# Patient Record
Sex: MALE | Race: WHITE | HISPANIC OR LATINO | Employment: FULL TIME | URBAN - METROPOLITAN AREA
[De-identification: names, ages, dates, MRNs, and addresses within clinical notes are randomized per-mention and may not be internally consistent; named-entity substitution may affect disease eponyms.]

---

## 2019-08-07 ENCOUNTER — OFFICE VISIT (OUTPATIENT)
Dept: FAMILY MEDICINE CLINIC | Facility: CLINIC | Age: 54
End: 2019-08-07
Payer: COMMERCIAL

## 2019-08-07 VITALS
HEART RATE: 76 BPM | SYSTOLIC BLOOD PRESSURE: 126 MMHG | DIASTOLIC BLOOD PRESSURE: 80 MMHG | HEIGHT: 72 IN | BODY MASS INDEX: 24.92 KG/M2 | WEIGHT: 184 LBS | RESPIRATION RATE: 16 BRPM | TEMPERATURE: 97.5 F

## 2019-08-07 DIAGNOSIS — J45.30 MILD PERSISTENT ASTHMA, UNSPECIFIED WHETHER COMPLICATED: Primary | ICD-10-CM

## 2019-08-07 DIAGNOSIS — Z12.11 ENCOUNTER FOR SCREENING FOR MALIGNANT NEOPLASM OF COLON: ICD-10-CM

## 2019-08-07 DIAGNOSIS — Z13.6 SCREENING FOR CARDIOVASCULAR CONDITION: ICD-10-CM

## 2019-08-07 PROCEDURE — 99203 OFFICE O/P NEW LOW 30 MIN: CPT | Performed by: FAMILY MEDICINE

## 2019-08-07 RX ORDER — FLUTICASONE PROPIONATE AND SALMETEROL XINAFOATE 45; 21 UG/1; UG/1
2 AEROSOL, METERED RESPIRATORY (INHALATION) 2 TIMES DAILY
Refills: 2 | COMMUNITY
Start: 2019-05-18 | End: 2019-09-24 | Stop reason: SDUPTHER

## 2019-08-07 NOTE — PROGRESS NOTES
Chief Complaint   Patient presents with    [facility]     Tdap up to date - ordered crc        Patient ID: Daily Valdovinos is a 62 y o  male  HPI  Pt is seeing for f/u Asthma as a new pt -  Using a vape with nicotine  -  Former smoker, using Advair 2 times a wk  -  "it is helping more that rescue inhaler"    The following portions of the patient's history were reviewed and updated as appropriate: allergies, current medications, past family history, past medical history, past social history, past surgical history and problem list     Review of Systems   Constitutional: Negative  Respiratory: Positive for wheezing (2 times per wk )  Negative for cough, chest tightness and shortness of breath  Cardiovascular: Negative  Gastrointestinal: Negative  Genitourinary: Negative  Musculoskeletal: Negative  Skin: Negative  Neurological: Negative  Current Outpatient Medications   Medication Sig Dispense Refill    ADVAIR HFA 45-21 MCG/ACT inhaler Inhale 2 puffs 2 (two) times a day  2     No current facility-administered medications for this visit  Objective:    /80 (BP Location: Left arm, Patient Position: Sitting, Cuff Size: Standard)   Pulse 76   Temp 97 5 °F (36 4 °C) (Tympanic)   Resp 16   Ht 5' 11 5" (1 816 m)   Wt 83 5 kg (184 lb)   BMI 25 31 kg/m²        Physical Exam   Constitutional: No distress  Cardiovascular: Normal rate, regular rhythm and normal heart sounds  No murmur heard  Pulmonary/Chest: Effort normal  No respiratory distress  He has decreased breath sounds  He has no wheezes  He has no rhonchi  He has no rales  Assessment/Plan:         Diagnoses and all orders for this visit:    Mild persistent asthma, unspecified whether complicated    Encounter for screening for malignant neoplasm of colon  -     Ambulatory referral to Gastroenterology; Future    Screening for cardiovascular condition  -     CBC;  Future  -     Comprehensive metabolic panel; Future  -     Lipid panel; Future  -     TSH, 3rd generation; Future    Other orders  -     ADVAIR HFA 45-21 MCG/ACT inhaler;  Inhale 2 puffs 2 (two) times a day            rto for Tova Pena MD

## 2019-08-10 ENCOUNTER — TELEPHONE (OUTPATIENT)
Dept: FAMILY MEDICINE CLINIC | Facility: CLINIC | Age: 54
End: 2019-08-10

## 2019-08-22 ENCOUNTER — OFFICE VISIT (OUTPATIENT)
Dept: FAMILY MEDICINE CLINIC | Facility: CLINIC | Age: 54
End: 2019-08-22
Payer: COMMERCIAL

## 2019-08-22 VITALS
HEIGHT: 72 IN | RESPIRATION RATE: 16 BRPM | BODY MASS INDEX: 25.06 KG/M2 | SYSTOLIC BLOOD PRESSURE: 116 MMHG | WEIGHT: 185 LBS | HEART RATE: 72 BPM | TEMPERATURE: 97.6 F | DIASTOLIC BLOOD PRESSURE: 80 MMHG

## 2019-08-22 DIAGNOSIS — Z00.00 ROUTINE MEDICAL EXAM: Primary | ICD-10-CM

## 2019-08-22 DIAGNOSIS — J45.30 MILD PERSISTENT ASTHMA, UNSPECIFIED WHETHER COMPLICATED: ICD-10-CM

## 2019-08-22 PROCEDURE — 94010 BREATHING CAPACITY TEST: CPT | Performed by: FAMILY MEDICINE

## 2019-08-22 PROCEDURE — 99396 PREV VISIT EST AGE 40-64: CPT | Performed by: FAMILY MEDICINE

## 2019-08-22 NOTE — PROGRESS NOTES
Chief Complaint   Patient presents with    Physical Exam        Patient ID: Cuong Vidal is a 62 y o  male  HPI  Pt is seeing for CPE     The following portions of the patient's history were reviewed and updated as appropriate: allergies, current medications, past family history, past medical history, past social history, past surgical history and problem list     Review of Systems   Constitutional: Negative for fatigue, fever and unexpected weight change  HENT: Negative for congestion, ear discharge, ear pain, hearing loss, rhinorrhea, sinus pressure, sore throat and trouble swallowing  Eyes: Negative  Respiratory: Negative  Cardiovascular: Negative  Gastrointestinal: Negative  Endocrine: Negative  Genitourinary: Negative  Musculoskeletal: Negative  Skin: Negative  Neurological: Negative for dizziness, weakness, light-headedness and numbness  Hematological: Negative  Psychiatric/Behavioral: Negative  Current Outpatient Medications   Medication Sig Dispense Refill    ADVAIR HFA 45-21 MCG/ACT inhaler Inhale 2 puffs 2 (two) times a day  2     No current facility-administered medications for this visit  Objective:    /80 (BP Location: Left arm, Patient Position: Sitting, Cuff Size: Large)   Pulse 72   Temp 97 6 °F (36 4 °C) (Tympanic)   Resp 16   Ht 5' 11 5" (1 816 m)   Wt 83 9 kg (185 lb)   BMI 25 44 kg/m²        Physical Exam   Constitutional: He is oriented to person, place, and time  He appears well-developed and well-nourished  No distress  HENT:   Head: Normocephalic and atraumatic  Right Ear: Hearing, tympanic membrane, external ear and ear canal normal    Left Ear: Hearing, tympanic membrane, external ear and ear canal normal    Mouth/Throat: Oropharynx is clear and moist  No oropharyngeal exudate  Eyes: Conjunctivae and EOM are normal    Neck: Neck supple  No JVD present  No thyroid mass and no thyromegaly present     Cardiovascular: Regular rhythm and normal heart sounds  Exam reveals no gallop  No murmur heard  Pulmonary/Chest: Breath sounds normal  No respiratory distress  He has no wheezes  He has no rhonchi  He has no rales  Abdominal: Soft  Bowel sounds are normal  There is no tenderness  Musculoskeletal: He exhibits no edema, tenderness or deformity  Lymphadenopathy:     He has no cervical adenopathy  Neurological: He is alert and oriented to person, place, and time  He has normal strength  No cranial nerve deficit  Skin: Skin is intact  No rash noted  No erythema  Psychiatric: He has a normal mood and affect  His behavior is normal          Labs in chart were reviewed          Assessment/Plan:         Diagnoses and all orders for this visit:    Routine medical exam    Mild persistent asthma, unspecified whether complicated  -     POCT spirometry      labs reviewed with pt -  Low  HDL -  32    Diet changed and increase exercises advised   Will contact pharmacy for Shingrix vaccine  UTD with Td -  Will bring records     rto in 1  y                     Elida Holden MD

## 2019-09-24 DIAGNOSIS — J45.30 MILD PERSISTENT ASTHMA, UNSPECIFIED WHETHER COMPLICATED: Primary | ICD-10-CM

## 2019-09-24 RX ORDER — FLUTICASONE PROPIONATE AND SALMETEROL XINAFOATE 45; 21 UG/1; UG/1
2 AEROSOL, METERED RESPIRATORY (INHALATION) 2 TIMES DAILY
Qty: 1 INHALER | Refills: 6 | Status: SHIPPED | OUTPATIENT
Start: 2019-09-24 | End: 2020-04-15

## 2019-10-11 ENCOUNTER — CONSULT (OUTPATIENT)
Dept: GASTROENTEROLOGY | Facility: CLINIC | Age: 54
End: 2019-10-11
Payer: COMMERCIAL

## 2019-10-11 VITALS
TEMPERATURE: 97.6 F | WEIGHT: 187.4 LBS | HEIGHT: 72 IN | DIASTOLIC BLOOD PRESSURE: 70 MMHG | RESPIRATION RATE: 18 BRPM | BODY MASS INDEX: 25.38 KG/M2 | HEART RATE: 76 BPM | SYSTOLIC BLOOD PRESSURE: 104 MMHG

## 2019-10-11 DIAGNOSIS — Z12.11 ENCOUNTER FOR SCREENING FOR MALIGNANT NEOPLASM OF COLON: Primary | ICD-10-CM

## 2019-10-11 PROCEDURE — 99243 OFF/OP CNSLTJ NEW/EST LOW 30: CPT | Performed by: INTERNAL MEDICINE

## 2019-10-11 NOTE — PROGRESS NOTES
Consultation - [facility]  Randa Gong 1965 male         Chief Complaint:  For colonoscopy    HPI:  75-year-old male with history of asthma was referred for colonoscopy  Patient never had colonoscopy in the past   Patient has regular bowel movements and denies any blood or mucus in the stool  Appetite is good and denies any recent weight loss  Denies any abdominal pain, nausea, or vomiting  Has no heartburn or acid reflux  Denies any difficulty swallowing  REVIEW OF SYSTEMS: Review of Systems   Constitutional: Negative for activity change, appetite change, chills, diaphoresis, fatigue, fever and unexpected weight change  HENT: Negative for ear discharge, ear pain, facial swelling, hearing loss, nosebleeds, sore throat, tinnitus and voice change  Eyes: Negative for pain, discharge, redness, itching and visual disturbance  Respiratory: Negative for apnea, cough, chest tightness, shortness of breath and wheezing  Cardiovascular: Negative for chest pain and palpitations  Gastrointestinal:        As noted in HPI   Endocrine: Negative for cold intolerance, heat intolerance and polyuria  Genitourinary: Negative for difficulty urinating, dysuria, flank pain, hematuria and urgency  Musculoskeletal: Negative for arthralgias, back pain, gait problem, joint swelling and myalgias  Skin: Negative for rash and wound  Neurological: Negative for dizziness, tremors, seizures, speech difficulty, light-headedness, numbness and headaches  Hematological: Negative for adenopathy  Does not bruise/bleed easily  Psychiatric/Behavioral: Negative for agitation, behavioral problems and confusion  The patient is not nervous/anxious           Past Medical History:   Diagnosis Date    Asthma       Past Surgical History:   Procedure Laterality Date    APPENDECTOMY       Social History     Socioeconomic History    Marital status: /Civil Union     Spouse name: Not on file    Number of children: Not on file    Years of education: Not on file    Highest education level: Not on file   Occupational History    Not on file   Social Needs    Financial resource strain: Not on file    Food insecurity:     Worry: Not on file     Inability: Not on file    Transportation needs:     Medical: Not on file     Non-medical: Not on file   Tobacco Use    Smoking status: Former Smoker     Last attempt to quit: 8/7/2016     Years since quitting: 3 1    Smokeless tobacco: Current User    Tobacco comment: vape   Substance and Sexual Activity    Alcohol use: Not on file    Drug use: Not on file    Sexual activity: Not on file   Lifestyle    Physical activity:     Days per week: Not on file     Minutes per session: Not on file    Stress: Not on file   Relationships    Social connections:     Talks on phone: Not on file     Gets together: Not on file     Attends [employer] service: Not on file     Active member of club or organization: Not on file     Attends meetings of clubs or organizations: Not on file     Relationship status: Not on file    Intimate partner violence:     Fear of current or ex partner: Not on file     Emotionally abused: Not on file     Physically abused: Not on file     Forced sexual activity: Not on file   Other Topics Concern    Not on file   Social History Narrative    Not on file     Family History   Problem Relation Age of Onset    Lung cancer Mother      Patient has no known allergies  Current Outpatient Medications   Medication Sig Dispense Refill    ADVAIR HFA 45-21 MCG/ACT inhaler Inhale 2 puffs 2 (two) times a day 1 Inhaler 6    Na Sulfate-K Sulfate-Mg Sulf (SUPREP BOWEL PREP KIT) 17 5-3 13-1 6 GM/177ML SOLN Take 2 Bottles by mouth see administration instructions Please follow the instructions from the office 2 Bottle 0     No current facility-administered medications for this visit  Blood pressure 104/70, pulse 76, temperature 97 6 °F (36 4 °C), resp  rate 18, height 5' 11 5" (1 816 m), weight 85 kg (187 lb 6 4 oz)  PHYSICAL EXAM: Physical Exam   Constitutional: He is oriented to person, place, and time  He appears well-developed  HENT:   Head: Normocephalic and atraumatic  Mouth/Throat: Oropharynx is clear and moist    Eyes: Pupils are equal, round, and reactive to light  Conjunctivae are normal  Right eye exhibits no discharge  Left eye exhibits no discharge  No scleral icterus  Neck: Neck supple  No JVD present  No tracheal deviation present  No thyromegaly present  Cardiovascular: Normal rate, regular rhythm, normal heart sounds and intact distal pulses  Exam reveals no gallop and no friction rub  No murmur heard  Pulmonary/Chest: Effort normal and breath sounds normal  No respiratory distress  He has no wheezes  He has no rales  He exhibits no tenderness  Abdominal: Soft  Bowel sounds are normal  He exhibits no distension and no mass  There is no tenderness  There is no rebound and no guarding  No hernia  Musculoskeletal: He exhibits no edema  Lymphadenopathy:     He has no cervical adenopathy  Neurological: He is alert and oriented to person, place, and time  Skin: Skin is warm and dry  No rash noted  No erythema  Psychiatric: He has a normal mood and affect  His behavior is normal  Thought content normal         No results found for: WBC, HGB, HCT, MCV, PLT  No results found for: GLUCOSE, CALCIUM, NA, K, CO2, CL, BUN, CREATININE  No results found for: ALT, AST, GGT, ALKPHOS, BILITOT  No results found for: INR, PROTIME    Patient was never admitted  ASSESSMENT & PLAN:    Encounter for screening for malignant neoplasm of colon  Screening for colon cancer - patient is at average risk for colon cancer screening  Rule out colorectal lesions including polyps or malignancy         -Schedule for colonoscopy      -High-fiber diet     -Patient was given instructions about the colonoscopy prep     -Patient was explained about  the risks and benefits of the procedure  Risks including but not limited to bleeding, infection, perforation were explained in detail  Also explained about less than 100% sensitivity with the exam and other alternatives

## 2019-11-08 NOTE — PRE-PROCEDURE INSTRUCTIONS
Pre-Surgery Instructions:   Medication Instructions   [facility] 20-62 MCG/ACT [facility]er Patient was instructed by Physician and understands   Na Sulfate-K Sulfate-Mg Sulf (SUPREP BOWEL PREP KIT) 17 5-3 13-1 6 GM/177ML SOLN Patient was instructed by Physician and understands  Pt to follow Dr Jh Palmer instructions    wife Peter Feliciano [phone]

## 2019-11-09 ENCOUNTER — ANESTHESIA EVENT (OUTPATIENT)
Dept: GASTROENTEROLOGY | Facility: AMBULARY SURGERY CENTER | Age: 54
End: 2019-11-09

## 2019-11-11 ENCOUNTER — HOSPITAL ENCOUNTER (OUTPATIENT)
Dept: GASTROENTEROLOGY | Facility: AMBULARY SURGERY CENTER | Age: 54
Setting detail: OUTPATIENT SURGERY
Discharge: HOME | End: 2019-11-11
Attending: INTERNAL MEDICINE | Admitting: INTERNAL MEDICINE
Payer: COMMERCIAL

## 2019-11-11 ENCOUNTER — ANESTHESIA (OUTPATIENT)
Dept: GASTROENTEROLOGY | Facility: AMBULARY SURGERY CENTER | Age: 54
End: 2019-11-11

## 2019-11-11 VITALS
OXYGEN SATURATION: 97 % | DIASTOLIC BLOOD PRESSURE: 76 MMHG | WEIGHT: 187 LBS | HEIGHT: 72 IN | SYSTOLIC BLOOD PRESSURE: 124 MMHG | BODY MASS INDEX: 25.33 KG/M2 | TEMPERATURE: 96.4 F | HEART RATE: 71 BPM | RESPIRATION RATE: 18 BRPM

## 2019-11-11 DIAGNOSIS — Z12.11 ENCOUNTER FOR SCREENING FOR MALIGNANT NEOPLASM OF COLON: ICD-10-CM

## 2019-11-11 PROCEDURE — G0121 COLON CA SCRN NOT HI RSK IND: HCPCS | Performed by: INTERNAL MEDICINE

## 2019-11-11 RX ORDER — PROPOFOL 10 MG/ML
INJECTION, EMULSION INTRAVENOUS CONTINUOUS PRN
Status: DISCONTINUED | OUTPATIENT
Start: 2019-11-11 | End: 2019-11-11 | Stop reason: SURG

## 2019-11-11 RX ORDER — PROPOFOL 10 MG/ML
INJECTION, EMULSION INTRAVENOUS AS NEEDED
Status: DISCONTINUED | OUTPATIENT
Start: 2019-11-11 | End: 2019-11-11 | Stop reason: SURG

## 2019-11-11 RX ORDER — SODIUM CHLORIDE, SODIUM LACTATE, POTASSIUM CHLORIDE, CALCIUM CHLORIDE 600; 310; 30; 20 MG/100ML; MG/100ML; MG/100ML; MG/100ML
125 INJECTION, SOLUTION INTRAVENOUS CONTINUOUS
Status: DISCONTINUED | OUTPATIENT
Start: 2019-11-11 | End: 2019-11-15 | Stop reason: HOSPADM

## 2019-11-11 RX ADMIN — PROPOFOL 120 MCG/KG/MIN: 10 INJECTION, EMULSION INTRAVENOUS at 11:35

## 2019-11-11 RX ADMIN — SODIUM CHLORIDE, SODIUM LACTATE, POTASSIUM CHLORIDE, AND CALCIUM CHLORIDE: .6; .31; .03; .02 INJECTION, SOLUTION INTRAVENOUS at 11:22

## 2019-11-11 RX ADMIN — PROPOFOL 70 MG: 10 INJECTION, EMULSION INTRAVENOUS at 11:35

## 2019-11-11 RX ADMIN — PROPOFOL 30 MG: 10 INJECTION, EMULSION INTRAVENOUS at 11:37

## 2019-11-11 RX ADMIN — SODIUM CHLORIDE, SODIUM LACTATE, POTASSIUM CHLORIDE, AND CALCIUM CHLORIDE 125 ML/HR: .6; .31; .03; .02 INJECTION, SOLUTION INTRAVENOUS at 11:11

## 2019-11-11 NOTE — ANESTHESIA POSTPROCEDURE EVALUATION
Post-Op Assessment Note    CV Status:  Stable  Pain Score: 0    Pain management: adequate     Mental Status:  Alert and awake   Hydration Status:  Euvolemic   PONV Controlled:  Controlled   Airway Patency:  Patent   Post Op Vitals Reviewed: Yes      Staff: CRNA           /62 (11/11/19 1146)    Temp     Pulse 96 (11/11/19 1146)   Resp 15 (11/11/19 1146)    SpO2 98 % (11/11/19 1146)

## 2019-11-11 NOTE — H&P
History and Physical - [facility]  Corrie Marcie 62 y o  male MRN: [id number]        HPI:  29-year-old male with no significant past medical history was referred for colonoscopy  Regular bowel movements  Historical Information   Past Medical History:   Diagnosis Date    Asthma     Contact lens/glasses fitting     and glasses    Tinnitus      Past Surgical History:   Procedure Laterality Date    APPENDECTOMY      age 41   [facility] TONSILLECTOMY      WISDOM TOOTH EXTRACTION       Social History   Social History     Substance and Sexual Activity   Alcohol Use Yes    Frequency: 2-4 times a month    Comment: occ     Social History     Substance and Sexual Activity   Drug Use Never     Social History     Tobacco Use   Smoking Status Former Smoker    Last attempt to quit: 8/7/2016    Years since quitting: 3 2   Smokeless Tobacco Never Used   Tobacco Comment    vapes daily     Family History   Problem Relation Age of Onset    Lung cancer Mother     Cancer Mother         lung,brain    No Known Problems Sister     No Known Problems Brother     No Known Problems Sister        Meds/Allergies       (Not in a hospital admission)    No Known Allergies    Objective     Blood pressure 131/84, pulse 75, temperature (!) 96 4 °F (35 8 °C), temperature source Tympanic, resp  rate 16, height 5' 11 5" (1 816 m), weight 84 8 kg (187 lb), SpO2 99 %      PHYSICAL EXAM:    Gen: NAD  CV: S1 & S2 normal, RRR  CHEST: Clear to auscultate  ABD: soft, NT/ND, good bowel sounds  EXT: no edema    ASSESSMENT:     Screening for colon cancer    PLAN:    Colonoscopy

## 2019-11-11 NOTE — ANESTHESIA PREPROCEDURE EVALUATION
Review of Systems/Medical History  Patient summary reviewed  Chart reviewed  No history of anesthetic complications     Cardiovascular  Negative cardio ROS Exercise tolerance (METS): >4,     Pulmonary  Smoker e-cigarette user  , Tobacco cessation counseling given , Asthma , well controlled/ stable Last rescue: > 1 year ago Asthma type of rescue: PRN inhaler,        GI/Hepatic    Bowel prep       Negative  ROS        Endo/Other  Negative endo/other ROS      GYN  Negative gynecology ROS          Hematology  Negative hematology ROS      Musculoskeletal  Negative musculoskeletal ROS        Neurology  Negative neurology ROS      Psychology   Negative psychology ROS              Physical Exam    Airway    Mallampati score: II  TM Distance: >3 FB  Neck ROM: full     Dental   No notable dental hx     Cardiovascular  Comment: Negative ROS, Rhythm: regular, Rate: normal, Cardiovascular exam normal    Pulmonary  Pulmonary exam normal Breath sounds clear to auscultation,     Other Findings        Anesthesia Plan  ASA Score- 2     Anesthesia Type- general with ASA Monitors  Additional Monitors:   Airway Plan:         Plan Factors- Patient instructed to abstain from smoking on day of procedure  Patient smoked on day of surgery  Induction- intravenous  Postoperative Plan-     Informed Consent- Anesthetic plan and risks discussed with patient  I personally reviewed this patient with the CRNA  Discussed and agreed on the Anesthesia Plan with the CRNA  Robert Kim

## 2020-04-14 DIAGNOSIS — J45.30 MILD PERSISTENT ASTHMA, UNSPECIFIED WHETHER COMPLICATED: ICD-10-CM

## 2020-04-15 RX ORDER — FLUTICASONE PROPIONATE AND SALMETEROL XINAFOATE 45; 21 UG/1; UG/1
AEROSOL, METERED RESPIRATORY (INHALATION)
Qty: 1 INHALER | Refills: 6 | Status: SHIPPED | OUTPATIENT
Start: 2020-04-15 | End: 2020-11-04 | Stop reason: SDUPTHER

## 2020-06-15 ENCOUNTER — OFFICE VISIT (OUTPATIENT)
Dept: FAMILY MEDICINE CLINIC | Facility: CLINIC | Age: 55
End: 2020-06-15
Payer: COMMERCIAL

## 2020-06-15 VITALS
SYSTOLIC BLOOD PRESSURE: 110 MMHG | HEART RATE: 72 BPM | BODY MASS INDEX: 26.01 KG/M2 | RESPIRATION RATE: 16 BRPM | HEIGHT: 72 IN | TEMPERATURE: 98.6 F | WEIGHT: 192 LBS | DIASTOLIC BLOOD PRESSURE: 70 MMHG

## 2020-06-15 DIAGNOSIS — Z00.00 ROUTINE MEDICAL EXAM: Primary | ICD-10-CM

## 2020-06-15 DIAGNOSIS — L23.7 POISON IVY DERMATITIS: ICD-10-CM

## 2020-06-15 DIAGNOSIS — L98.9 SKIN LESION OF RIGHT ARM: ICD-10-CM

## 2020-06-15 DIAGNOSIS — Z12.5 SCREENING FOR PROSTATE CANCER: ICD-10-CM

## 2020-06-15 PROBLEM — J45.909 ASTHMA: Status: ACTIVE | Noted: 2020-06-15

## 2020-06-15 PROCEDURE — 99396 PREV VISIT EST AGE 40-64: CPT | Performed by: FAMILY MEDICINE

## 2020-06-15 PROCEDURE — 3008F BODY MASS INDEX DOCD: CPT | Performed by: FAMILY MEDICINE

## 2020-06-15 RX ORDER — METHYLPREDNISOLONE 4 MG/1
TABLET ORAL
Qty: 21 EACH | Refills: 0 | Status: SHIPPED | OUTPATIENT
Start: 2020-06-15 | End: 2020-11-24

## 2020-06-17 LAB
ALBUMIN SERPL-MCNC: 4.3 G/DL (ref 3.6–5.1)
ALBUMIN/GLOB SERPL: 1.7 (CALC) (ref 1–2.5)
ALP SERPL-CCNC: 45 U/L (ref 35–144)
ALT SERPL-CCNC: 32 U/L (ref 9–46)
AST SERPL-CCNC: 21 U/L (ref 10–35)
BILIRUB SERPL-MCNC: 0.8 MG/DL (ref 0.2–1.2)
BUN SERPL-MCNC: 16 MG/DL (ref 7–25)
BUN/CREAT SERPL: ABNORMAL (CALC) (ref 6–22)
CALCIUM SERPL-MCNC: 9.4 MG/DL (ref 8.6–10.3)
CHLORIDE SERPL-SCNC: 106 MMOL/L (ref 98–110)
CHOLEST SERPL-MCNC: 171 MG/DL
CHOLEST/HDLC SERPL: 4.8 (CALC)
CO2 SERPL-SCNC: 28 MMOL/L (ref 20–32)
CREAT SERPL-MCNC: 1.12 MG/DL (ref 0.7–1.33)
GLOBULIN SER CALC-MCNC: 2.6 G/DL (CALC) (ref 1.9–3.7)
GLUCOSE SERPL-MCNC: 100 MG/DL (ref 65–99)
HBA1C MFR BLD: 4.6 % OF TOTAL HGB
HDLC SERPL-MCNC: 36 MG/DL
LDLC SERPL CALC-MCNC: 121 MG/DL (CALC)
NONHDLC SERPL-MCNC: 135 MG/DL (CALC)
POTASSIUM SERPL-SCNC: 4.4 MMOL/L (ref 3.5–5.3)
PROT SERPL-MCNC: 6.9 G/DL (ref 6.1–8.1)
PSA SERPL-MCNC: 0.4 NG/ML
SL AMB EGFR AFRICAN AMERICAN: 84 ML/MIN/1.73M2
SL AMB EGFR NON AFRICAN AMERICAN: 73 ML/MIN/1.73M2
SODIUM SERPL-SCNC: 140 MMOL/L (ref 135–146)
TRIGL SERPL-MCNC: 57 MG/DL

## 2020-06-22 DIAGNOSIS — L23.7 POISON IVY DERMATITIS: Primary | ICD-10-CM

## 2020-06-22 RX ORDER — MOMETASONE FUROATE 1 MG/G
OINTMENT TOPICAL 2 TIMES DAILY
Qty: 45 G | Refills: 0 | Status: SHIPPED | OUTPATIENT
Start: 2020-06-22 | End: 2020-11-04

## 2020-06-22 RX ORDER — HYDROXYZINE PAMOATE 25 MG/1
25 CAPSULE ORAL 3 TIMES DAILY PRN
Qty: 30 CAPSULE | Refills: 0 | Status: SHIPPED | OUTPATIENT
Start: 2020-06-22 | End: 2020-11-24

## 2020-11-04 DIAGNOSIS — L23.7 POISON IVY DERMATITIS: ICD-10-CM

## 2020-11-04 DIAGNOSIS — J45.30 MILD PERSISTENT ASTHMA, UNSPECIFIED WHETHER COMPLICATED: ICD-10-CM

## 2020-11-04 RX ORDER — MOMETASONE FUROATE 1 MG/G
OINTMENT TOPICAL
Qty: 45 G | Refills: 0 | Status: SHIPPED | OUTPATIENT
Start: 2020-11-04 | End: 2020-11-24

## 2020-11-04 RX ORDER — FLUTICASONE PROPIONATE AND SALMETEROL XINAFOATE 45; 21 UG/1; UG/1
2 AEROSOL, METERED RESPIRATORY (INHALATION) 2 TIMES DAILY
Qty: 1 INHALER | Refills: 0 | Status: SHIPPED | OUTPATIENT
Start: 2020-11-04 | End: 2020-12-21

## 2020-11-24 ENCOUNTER — TELEMEDICINE (OUTPATIENT)
Dept: FAMILY MEDICINE CLINIC | Facility: CLINIC | Age: 55
End: 2020-11-24
Payer: COMMERCIAL

## 2020-11-24 DIAGNOSIS — J06.9 UPPER RESPIRATORY TRACT INFECTION, UNSPECIFIED TYPE: Primary | ICD-10-CM

## 2020-11-24 DIAGNOSIS — J06.9 UPPER RESPIRATORY TRACT INFECTION, UNSPECIFIED TYPE: ICD-10-CM

## 2020-11-24 PROCEDURE — 99213 OFFICE O/P EST LOW 20 MIN: CPT | Performed by: FAMILY MEDICINE

## 2020-11-24 PROCEDURE — U0003 INFECTIOUS AGENT DETECTION BY NUCLEIC ACID (DNA OR RNA); SEVERE ACUTE RESPIRATORY SYNDROME CORONAVIRUS 2 (SARS-COV-2) (CORONAVIRUS DISEASE [COVID-19]), AMPLIFIED PROBE TECHNIQUE, MAKING USE OF HIGH THROUGHPUT TECHNOLOGIES AS DESCRIBED BY CMS-2020-01-R: HCPCS | Performed by: FAMILY MEDICINE

## 2020-11-25 LAB — SARS-COV-2 RNA SPEC QL NAA+PROBE: NOT DETECTED

## 2020-12-19 DIAGNOSIS — J45.30 MILD PERSISTENT ASTHMA, UNSPECIFIED WHETHER COMPLICATED: ICD-10-CM

## 2020-12-21 RX ORDER — FLUTICASONE PROPIONATE AND SALMETEROL XINAFOATE 45; 21 UG/1; UG/1
2 AEROSOL, METERED RESPIRATORY (INHALATION) 2 TIMES DAILY
Qty: 3 INHALER | Refills: 3 | Status: SHIPPED | OUTPATIENT
Start: 2020-12-21 | End: 2020-12-28

## 2020-12-28 DIAGNOSIS — J45.40 MODERATE PERSISTENT ASTHMA WITHOUT COMPLICATION: Primary | ICD-10-CM

## 2021-03-08 ENCOUNTER — TELEPHONE (OUTPATIENT)
Dept: FAMILY MEDICINE CLINIC | Facility: CLINIC | Age: 56
End: 2021-03-08

## 2021-03-08 NOTE — TELEPHONE ENCOUNTER
----- Message from Corrie Jack sent at 3/8/2021  3:16 PM EST -----  Regarding: Non-Urgent Medical Question  Contact: [phone]  [facility] all is well with you and your family  I just wanted to let you know that I received my first Covid-19 vaccination today  Please see attached      Thank you    Karri Cardenas

## 2021-06-30 ENCOUNTER — OFFICE VISIT (OUTPATIENT)
Dept: FAMILY MEDICINE CLINIC | Facility: CLINIC | Age: 56
End: 2021-06-30
Payer: COMMERCIAL

## 2021-06-30 VITALS
BODY MASS INDEX: 25.06 KG/M2 | WEIGHT: 185 LBS | HEIGHT: 72 IN | TEMPERATURE: 98.4 F | RESPIRATION RATE: 16 BRPM | HEART RATE: 68 BPM | DIASTOLIC BLOOD PRESSURE: 80 MMHG | SYSTOLIC BLOOD PRESSURE: 126 MMHG

## 2021-06-30 DIAGNOSIS — Z00.00 GENERAL MEDICAL EXAM: Primary | ICD-10-CM

## 2021-06-30 DIAGNOSIS — J45.40 MODERATE PERSISTENT ASTHMA WITHOUT COMPLICATION: ICD-10-CM

## 2021-06-30 DIAGNOSIS — F17.290 VAPING NICOTINE DEPENDENCE, TOBACCO PRODUCT: ICD-10-CM

## 2021-06-30 DIAGNOSIS — Z12.5 SCREENING FOR PROSTATE CANCER: ICD-10-CM

## 2021-06-30 PROCEDURE — 99396 PREV VISIT EST AGE 40-64: CPT | Performed by: FAMILY MEDICINE

## 2021-06-30 PROCEDURE — 4004F PT TOBACCO SCREEN RCVD TLK: CPT | Performed by: FAMILY MEDICINE

## 2021-06-30 PROCEDURE — 3725F SCREEN DEPRESSION PERFORMED: CPT | Performed by: FAMILY MEDICINE

## 2021-06-30 PROCEDURE — 3008F BODY MASS INDEX DOCD: CPT | Performed by: FAMILY MEDICINE

## 2021-06-30 NOTE — PROGRESS NOTES
Chief Complaint   Patient presents with    Physical Exam        Patient ID: Thresa Gosselin is a 60 y o  male  HPI  Pt is seeing for CPE    The following portions of the patient's history were reviewed and updated as appropriate: allergies, current medications, past family history, past medical history, past social history, past surgical history and problem list     Review of Systems   Constitutional: Negative for fatigue, fever and unexpected weight change  HENT: Negative for congestion, ear discharge, ear pain, hearing loss, rhinorrhea, sinus pressure, sore throat and trouble swallowing  Eyes: Negative  Respiratory: Negative  Cardiovascular: Negative  Gastrointestinal: Negative  Endocrine: Negative  Genitourinary: Negative  Musculoskeletal: Negative  Skin: Negative  Neurological: Negative for dizziness, weakness, light-headedness and numbness  Hematological: Negative  Psychiatric/Behavioral: Negative  Current Outpatient Medications   Medication Sig Dispense Refill    fluticasone-salmeterol (Wixela Inhub) 100-50 mcg/dose inhaler Inhale 1 puff 2 (two) times a day Rinse mouth after use  3 blister 3     No current facility-administered medications for this visit  Objective:    /80   Pulse 68   Temp 98 4 °F (36 9 °C) (Tympanic)   Resp 16   Ht 5' 11 5" (1 816 m)   Wt 83 9 kg (185 lb)   BMI 25 44 kg/m²        Physical Exam  Constitutional:       General: He is not in acute distress  Appearance: Normal appearance  He is well-developed  He is not ill-appearing  HENT:      Head: Normocephalic and atraumatic  Right Ear: Hearing, tympanic membrane, ear canal and external ear normal       Left Ear: Hearing, tympanic membrane, ear canal and external ear normal       Nose: No congestion or rhinorrhea  Mouth/Throat:      Pharynx: No oropharyngeal exudate or posterior oropharyngeal erythema     Eyes:      Extraocular Movements: Extraocular movements intact  Conjunctiva/sclera: Conjunctivae normal    Neck:      Thyroid: No thyroid mass or thyromegaly  Vascular: No JVD  Cardiovascular:      Rate and Rhythm: Regular rhythm  Heart sounds: Normal heart sounds  No murmur heard  No gallop  Pulmonary:      Effort: No respiratory distress  Breath sounds: Normal breath sounds  No wheezing, rhonchi or rales  Abdominal:      General: Bowel sounds are normal       Palpations: Abdomen is soft  Tenderness: There is no abdominal tenderness  Musculoskeletal:      Cervical back: Neck supple  Right lower leg: No edema  Left lower leg: No edema  Lymphadenopathy:      Cervical: No cervical adenopathy  Neurological:      General: No focal deficit present  Mental Status: He is alert and oriented to person, place, and time  Cranial Nerves: No cranial nerve deficit  Psychiatric:         Mood and Affect: Mood normal          Behavior: Behavior normal          Thought Content: Thought content normal          Judgment: Judgment normal            Labs in chart were reviewed  Assessment/Plan:         Diagnoses and all orders for this visit:    General medical exam  -     Comprehensive metabolic panel; Future  -     Hemoglobin A1C; Future  -     Lipid panel; Future    Moderate persistent asthma without complication  -     fluticasone-salmeterol (Wixela Inhub) 100-50 mcg/dose inhaler; Inhale 1 puff 2 (two) times a day Rinse mouth after use  Screening for prostate cancer  -     PSA, Total Screen; Future    Vaping nicotine dependence, tobacco product      was advised to stop vaping       BMI Counseling: Body mass index is 25 44 kg/m²  Discussed the patient's BMI with him  The BMI is above normal  Exercise recommendations include exercising 3-5 times per week         rto in 1 y           Stefania Puente MD

## 2021-07-03 ENCOUNTER — TELEPHONE (OUTPATIENT)
Dept: FAMILY MEDICINE CLINIC | Facility: CLINIC | Age: 56
End: 2021-07-03

## 2021-07-03 NOTE — TELEPHONE ENCOUNTER
----- Message from Maria Guadalupe Graff sent at 7/3/2021 10:43 AM EDT -----  Regarding: Non-Urgent Medical Question  Contact: [phone]  Good morning  Hope all is well, I think I woke up with a cold  I have a sore throat, body ache and a headache  Is there anything we can do?     Thank you in advance    Senthil Escobar

## 2021-07-03 NOTE — TELEPHONE ENCOUNTER
Called pt said just saw [employer] [facility], wish he would have called office we would have squeezed him in, at this time we recommend he go to [address] now for na evaluation  Pt was ok with this   Thanked me and we hung up

## 2021-07-06 ENCOUNTER — TELEPHONE (OUTPATIENT)
Dept: FAMILY MEDICINE CLINIC | Facility: CLINIC | Age: 56
End: 2021-07-06

## 2021-07-06 NOTE — TELEPHONE ENCOUNTER
----- Message from Brenna German sent at 7/3/2021 10:43 AM EDT -----  Regarding: Non-Urgent Medical Question  Contact: [phone]  Good morning  Hope all is well, I think I woke up with a cold  I have a sore throat, body ache and a headache  Is there anything we can do?     Thank you in advance    Edgard Wheeler

## 2021-07-12 ENCOUNTER — TELEPHONE (OUTPATIENT)
Dept: FAMILY MEDICINE CLINIC | Facility: CLINIC | Age: 56
End: 2021-07-12

## 2021-07-12 NOTE — TELEPHONE ENCOUNTER
----- Message from Ar Zamora MD sent at 7/12/2021 11:23 AM EDT -----  Pl, advise pt -  all labs are good

## 2021-07-14 LAB
ALBUMIN SERPL-MCNC: 4.4 G/DL (ref 3.6–5.1)
ALBUMIN/GLOB SERPL: 1.8 (CALC) (ref 1–2.5)
ALP SERPL-CCNC: 46 U/L (ref 35–144)
ALT SERPL-CCNC: 24 U/L (ref 9–46)
AST SERPL-CCNC: 19 U/L (ref 10–35)
BILIRUB SERPL-MCNC: 0.8 MG/DL (ref 0.2–1.2)
BUN SERPL-MCNC: 18 MG/DL (ref 7–25)
BUN/CREAT SERPL: NORMAL (CALC) (ref 6–22)
CALCIUM SERPL-MCNC: 9.2 MG/DL (ref 8.6–10.3)
CHLORIDE SERPL-SCNC: 104 MMOL/L (ref 98–110)
CHOLEST SERPL-MCNC: 149 MG/DL
CHOLEST/HDLC SERPL: 4.5 (CALC)
CO2 SERPL-SCNC: 28 MMOL/L (ref 20–32)
CREAT SERPL-MCNC: 1.02 MG/DL (ref 0.7–1.33)
GLOBULIN SER CALC-MCNC: 2.4 G/DL (CALC) (ref 1.9–3.7)
GLUCOSE SERPL-MCNC: 98 MG/DL (ref 65–99)
HBA1C MFR BLD: 4.6 % OF TOTAL HGB
HDLC SERPL-MCNC: 33 MG/DL
LDLC SERPL CALC-MCNC: 99 MG/DL (CALC)
NONHDLC SERPL-MCNC: 116 MG/DL (CALC)
POTASSIUM SERPL-SCNC: 4.5 MMOL/L (ref 3.5–5.3)
PROT SERPL-MCNC: 6.8 G/DL (ref 6.1–8.1)
PSA SERPL-MCNC: 0.5 NG/ML
SL AMB EGFR AFRICAN AMERICAN: 93 ML/MIN/1.73M2
SL AMB EGFR NON AFRICAN AMERICAN: 81 ML/MIN/1.73M2
SODIUM SERPL-SCNC: 138 MMOL/L (ref 135–146)
TRIGL SERPL-MCNC: 82 MG/DL

## 2022-04-05 ENCOUNTER — OFFICE VISIT (OUTPATIENT)
Dept: FAMILY MEDICINE CLINIC | Facility: CLINIC | Age: 57
End: 2022-04-05
Payer: COMMERCIAL

## 2022-04-05 VITALS
HEART RATE: 70 BPM | TEMPERATURE: 98 F | HEIGHT: 72 IN | RESPIRATION RATE: 14 BRPM | SYSTOLIC BLOOD PRESSURE: 142 MMHG | DIASTOLIC BLOOD PRESSURE: 94 MMHG | WEIGHT: 191.2 LBS | BODY MASS INDEX: 25.9 KG/M2

## 2022-04-05 DIAGNOSIS — B36.9 FUNGAL DERMATITIS: Primary | ICD-10-CM

## 2022-04-05 DIAGNOSIS — R03.0 ELEVATED BLOOD PRESSURE READING: ICD-10-CM

## 2022-04-05 PROCEDURE — 99213 OFFICE O/P EST LOW 20 MIN: CPT | Performed by: FAMILY MEDICINE

## 2022-04-05 PROCEDURE — 4004F PT TOBACCO SCREEN RCVD TLK: CPT | Performed by: FAMILY MEDICINE

## 2022-04-05 PROCEDURE — 3008F BODY MASS INDEX DOCD: CPT | Performed by: FAMILY MEDICINE

## 2022-04-05 RX ORDER — PREDNISOLONE ACETATE 10 MG/ML
SUSPENSION/ DROPS OPHTHALMIC
COMMUNITY
Start: 2022-03-29 | End: 2022-06-13

## 2022-04-05 RX ORDER — CLOTRIMAZOLE AND BETAMETHASONE DIPROPIONATE 10; .64 MG/G; MG/G
CREAM TOPICAL 2 TIMES DAILY
Qty: 30 G | Refills: 0 | Status: SHIPPED | OUTPATIENT
Start: 2022-04-05 | End: 2022-06-13

## 2022-04-05 RX ORDER — OFLOXACIN 3 MG/ML
SOLUTION/ DROPS OPHTHALMIC
COMMUNITY
Start: 2022-03-31 | End: 2022-06-13

## 2022-04-10 PROBLEM — B36.9 FUNGAL DERMATITIS: Status: ACTIVE | Noted: 2022-04-10

## 2022-04-10 PROBLEM — R03.0 ELEVATED BLOOD PRESSURE READING: Status: ACTIVE | Noted: 2022-04-10

## 2022-04-11 NOTE — PROGRESS NOTES
Assessment/Plan:    1  Fungal dermatitis  Assessment & Plan:  Keep area cool/dry  aveeno soap    Orders:  -     clotrimazole-betamethasone (LOTRISONE) 1-0 05 % cream; Apply topically 2 (two) times a day X 2 weeks    2  Elevated blood pressure reading  Assessment & Plan:  Lower salt in diet  Limit caffeine  Avoid nsaid/decongestant use  rto for BP check         Subjective:      Patient ID: Santino Cavazos is a 60 y o  male  Chief Complaint   Patient presents with    Wound Check     sore on scrotum that has been there for a month and a half patient was putting desitinand neosporin  on it , it went away but then came back        HPI  C/o skin irritation groin/scrotal area  No fever or other rash  No hx trauma or known judith to STD  No new topical agents    The following portions of the patient's history were reviewed and updated as appropriate: allergies, current medications, past family history, past medical history, past social history, past surgical history and problem list     Review of Systems    Per hpi  Current Outpatient Medications   Medication Sig Dispense Refill    fluticasone-salmeterol (Advair Diskus) 100-50 mcg/dose inhaler       clotrimazole-betamethasone (LOTRISONE) 1-0 05 % cream Apply topically 2 (two) times a day X 2 weeks 30 g 0    ofloxacin (OCUFLOX) 0 3 % ophthalmic solution INSTILL 1 DROP INTO BOTH EYES 4 TIMES DAILY      prednisoLONE acetate (PRED FORTE) 1 % ophthalmic suspension INSTILL 1 DROP INTO BOTH EYES 4 TIMES DAILY       No current facility-administered medications for this visit  Objective:    /94 (BP Location: Left arm, Patient Position: Sitting, Cuff Size: Large)   Pulse 70   Temp 98 °F (36 7 °C)   Resp 14   Ht 5' 11 5" (1 816 m)   Wt 86 7 kg (191 lb 3 2 oz)   BMI 26 30 kg/m²        Physical Exam  Vitals and nursing note reviewed  Constitutional:       General: He is not in acute distress  Skin:     General: Skin is warm and dry        Findings: Rash (red, irritated lesion right  groin fold & near base of scrotum) present  Neurological:      Mental Status: He is alert             Sil Roberts MD

## 2022-06-03 DIAGNOSIS — J45.40 MODERATE PERSISTENT ASTHMA WITHOUT COMPLICATION: Primary | ICD-10-CM

## 2022-06-13 ENCOUNTER — OFFICE VISIT (OUTPATIENT)
Dept: FAMILY MEDICINE CLINIC | Facility: CLINIC | Age: 57
End: 2022-06-13
Payer: COMMERCIAL

## 2022-06-13 VITALS
SYSTOLIC BLOOD PRESSURE: 124 MMHG | HEIGHT: 72 IN | RESPIRATION RATE: 14 BRPM | HEART RATE: 72 BPM | TEMPERATURE: 97.8 F | WEIGHT: 187 LBS | BODY MASS INDEX: 25.33 KG/M2 | DIASTOLIC BLOOD PRESSURE: 84 MMHG

## 2022-06-13 DIAGNOSIS — J45.40 MODERATE PERSISTENT ASTHMA WITHOUT COMPLICATION: ICD-10-CM

## 2022-06-13 DIAGNOSIS — Z00.00 ANNUAL PHYSICAL EXAM: Primary | ICD-10-CM

## 2022-06-13 DIAGNOSIS — R03.0 ELEVATED BLOOD PRESSURE READING: ICD-10-CM

## 2022-06-13 DIAGNOSIS — Z12.5 SCREENING FOR PROSTATE CANCER: ICD-10-CM

## 2022-06-13 PROCEDURE — 99396 PREV VISIT EST AGE 40-64: CPT | Performed by: FAMILY MEDICINE

## 2022-06-13 PROCEDURE — 4004F PT TOBACCO SCREEN RCVD TLK: CPT | Performed by: FAMILY MEDICINE

## 2022-06-13 PROCEDURE — 3008F BODY MASS INDEX DOCD: CPT | Performed by: FAMILY MEDICINE

## 2022-06-13 PROCEDURE — 3725F SCREEN DEPRESSION PERFORMED: CPT | Performed by: FAMILY MEDICINE

## 2022-06-13 NOTE — PROGRESS NOTES
Chief Complaint   Patient presents with    Physical Exam     Just saw eye         Patient ID: Santino Cavazos is a 59 y o  male  HPI  Pt is seeing for CPE     The following portions of the patient's history were reviewed and updated as appropriate: allergies, current medications, past family history, past medical history, past social history, past surgical history and problem list     Review of Systems   Constitutional: Negative for fatigue, fever and unexpected weight change  HENT: Negative for congestion, ear discharge, ear pain, hearing loss, rhinorrhea, sinus pressure, sore throat and trouble swallowing  Eyes: Negative  Respiratory: Negative  Cardiovascular: Negative  Gastrointestinal: Negative  Endocrine: Negative  Genitourinary: Negative  Musculoskeletal: Negative  Skin: Negative  Neurological: Negative for dizziness, weakness, light-headedness and numbness  Hematological: Negative  Psychiatric/Behavioral: Negative  Current Outpatient Medications   Medication Sig Dispense Refill    fluticasone-salmeterol (Advair) 100-50 mcg/dose inhaler Inhale 1 puff 2 (two) times a day 60 blister 11     No current facility-administered medications for this visit  Objective:    /84 Comment: by MD  Pulse 72   Temp 97 8 °F (36 6 °C) (Temporal)   Resp 14   Ht 5' 11 5" (1 816 m)   Wt 84 8 kg (187 lb)   BMI 25 72 kg/m²        Physical Exam  Constitutional:       General: He is not in acute distress  Appearance: He is well-developed  He is not ill-appearing  HENT:      Head: Normocephalic and atraumatic  Right Ear: Hearing, tympanic membrane, ear canal and external ear normal       Left Ear: Hearing, tympanic membrane, ear canal and external ear normal       Nose: No congestion or rhinorrhea  Mouth/Throat:      Pharynx: No oropharyngeal exudate or posterior oropharyngeal erythema  Eyes:      Extraocular Movements: Extraocular movements intact  Conjunctiva/sclera: Conjunctivae normal    Neck:      Thyroid: No thyroid mass or thyromegaly  Vascular: No JVD  Cardiovascular:      Rate and Rhythm: Normal rate and regular rhythm  Heart sounds: Normal heart sounds  No murmur heard  No gallop  Pulmonary:      Effort: No respiratory distress  Breath sounds: Normal breath sounds  No wheezing, rhonchi or rales  Abdominal:      General: Bowel sounds are normal       Palpations: Abdomen is soft  Tenderness: There is no abdominal tenderness  Musculoskeletal:      Cervical back: Neck supple  Right lower leg: No edema  Left lower leg: No edema  Lymphadenopathy:      Cervical: No cervical adenopathy  Skin:     Coloration: Skin is not pale  Findings: No erythema  Neurological:      General: No focal deficit present  Mental Status: He is alert and oriented to person, place, and time  Cranial Nerves: No cranial nerve deficit  Psychiatric:         Mood and Affect: Mood normal          Behavior: Behavior normal          Thought Content: Thought content normal          Judgment: Judgment normal            Labs in chart were reviewed  Assessment/Plan:         Diagnoses and all orders for this visit:    Annual physical exam  -     CBC; Future  -     Comprehensive metabolic panel; Future  -     Lipid panel; Future  -     Hemoglobin A1C; Future  -     TSH, 3rd generation; Future    Screening for prostate cancer  -     PSA, Total Screen; Future    Moderate persistent asthma without complication  -     fluticasone-salmeterol (Advair) 100-50 mcg/dose inhaler; Inhale 1 puff 2 (two) times a day    Elevated blood pressure reading       Low Na diet, daily waking at least 30 min   BP log at home       BMI Counseling: Body mass index is 25 72 kg/m²  Discussed the patient's BMI with him  The BMI is above normal  Exercise recommendations include exercising 3-5 times per week         rto in 1 y           Sandeep Ponce MD

## 2022-06-24 ENCOUNTER — TELEPHONE (OUTPATIENT)
Dept: FAMILY MEDICINE CLINIC | Facility: CLINIC | Age: 57
End: 2022-06-24

## 2022-06-24 NOTE — TELEPHONE ENCOUNTER
----- Message from Sahil Cadena sent at 6/24/2022 11:59 AM EDT -----  Regarding: FW: Sore Throat   Please call pt to schedule a visit for symptoms below    ----- Message -----  From: Michell Waller  Sent: 6/24/2022   8:52 AM EDT  To: Gurpreet Novoa [facility]  Subject: Sore Throat                                      Good morning Dr Romulo Simon i started with a sore throat, i took chloroseptic to ease the pain  I do not have a fever, just sore throat and feel a bit weak  Any recommendations?     Thank you in advance,    Ant Adorno

## 2022-06-24 NOTE — TELEPHONE ENCOUNTER
Spoke to patient and he says his throat feels better ,does not want an appointment throat culture/cma----- Message from Omar Plummer MD sent at 6/24/2022  1:02 PM EDT -----  Regarding: FW: Sore Throat   Pl, advise pt -  needs an wai tomorrow    ----- Message -----  From: Tim Gunn  Sent: 6/24/2022  12:00 PM EDT  To: Omar Plummer MD, #  Subject: FW: Sore Throat                                  Please call pt to schedule a visit for symptoms below    ----- Message -----  From: Jory Madera  Sent: 6/24/2022   8:52 AM EDT  To: [facility]  Subject: Sore Throat                                      Good morning Dr Alanna Collins i started with a sore throat, i took chloroseptic to ease the pain  I do not have a fever, just sore throat and feel a bit weak  Any recommendations?     Thank you in advance,    Hayley Ireland

## 2022-06-28 LAB
ALBUMIN SERPL-MCNC: 4.1 G/DL (ref 3.6–5.1)
ALBUMIN/GLOB SERPL: 1.9 (CALC) (ref 1–2.5)
ALP SERPL-CCNC: 38 U/L (ref 35–144)
ALT SERPL-CCNC: 34 U/L (ref 9–46)
AST SERPL-CCNC: 28 U/L (ref 10–35)
BASOPHILS # BLD AUTO: 59 CELLS/UL (ref 0–200)
BASOPHILS NFR BLD AUTO: 0.8 %
BILIRUB SERPL-MCNC: 0.6 MG/DL (ref 0.2–1.2)
BUN SERPL-MCNC: 18 MG/DL (ref 7–25)
BUN/CREAT SERPL: NORMAL (CALC) (ref 6–22)
CALCIUM SERPL-MCNC: 8.9 MG/DL (ref 8.6–10.3)
CHLORIDE SERPL-SCNC: 107 MMOL/L (ref 98–110)
CHOLEST SERPL-MCNC: 133 MG/DL
CHOLEST/HDLC SERPL: 4.2 (CALC)
CO2 SERPL-SCNC: 26 MMOL/L (ref 20–32)
CREAT SERPL-MCNC: 1.14 MG/DL (ref 0.7–1.33)
EOSINOPHIL # BLD AUTO: 422 CELLS/UL (ref 15–500)
EOSINOPHIL NFR BLD AUTO: 5.7 %
ERYTHROCYTE [DISTWIDTH] IN BLOOD BY AUTOMATED COUNT: 12.4 % (ref 11–15)
GLOBULIN SER CALC-MCNC: 2.2 G/DL (CALC) (ref 1.9–3.7)
GLUCOSE SERPL-MCNC: 97 MG/DL (ref 65–99)
HBA1C MFR BLD: 4.5 % OF TOTAL HGB
HCT VFR BLD AUTO: 45.8 % (ref 38.5–50)
HDLC SERPL-MCNC: 32 MG/DL
HGB BLD-MCNC: 15.5 G/DL (ref 13.2–17.1)
LDLC SERPL CALC-MCNC: 74 MG/DL (CALC)
LYMPHOCYTES # BLD AUTO: 1983 CELLS/UL (ref 850–3900)
LYMPHOCYTES NFR BLD AUTO: 26.8 %
MCH RBC QN AUTO: 30.8 PG (ref 27–33)
MCHC RBC AUTO-ENTMCNC: 33.8 G/DL (ref 32–36)
MCV RBC AUTO: 90.9 FL (ref 80–100)
MONOCYTES # BLD AUTO: 562 CELLS/UL (ref 200–950)
MONOCYTES NFR BLD AUTO: 7.6 %
NEUTROPHILS # BLD AUTO: 4373 CELLS/UL (ref 1500–7800)
NEUTROPHILS NFR BLD AUTO: 59.1 %
NONHDLC SERPL-MCNC: 101 MG/DL (CALC)
PLATELET # BLD AUTO: 244 THOUSAND/UL (ref 140–400)
PMV BLD REES-ECKER: 10.5 FL (ref 7.5–12.5)
POTASSIUM SERPL-SCNC: 4.1 MMOL/L (ref 3.5–5.3)
PROT SERPL-MCNC: 6.3 G/DL (ref 6.1–8.1)
PSA SERPL-MCNC: 0.43 NG/ML
RBC # BLD AUTO: 5.04 MILLION/UL (ref 4.2–5.8)
SL AMB EGFR AFRICAN AMERICAN: 81 ML/MIN/1.73M2
SL AMB EGFR NON AFRICAN AMERICAN: 70 ML/MIN/1.73M2
SODIUM SERPL-SCNC: 140 MMOL/L (ref 135–146)
TRIGL SERPL-MCNC: 176 MG/DL
TSH SERPL-ACNC: 2.59 MIU/L (ref 0.4–4.5)
WBC # BLD AUTO: 7.4 THOUSAND/UL (ref 3.8–10.8)

## 2022-07-14 ENCOUNTER — OFFICE VISIT (OUTPATIENT)
Dept: FAMILY MEDICINE CLINIC | Facility: CLINIC | Age: 57
End: 2022-07-14
Payer: COMMERCIAL

## 2022-07-14 VITALS
HEART RATE: 76 BPM | SYSTOLIC BLOOD PRESSURE: 150 MMHG | OXYGEN SATURATION: 97 % | RESPIRATION RATE: 16 BRPM | HEIGHT: 72 IN | BODY MASS INDEX: 25.19 KG/M2 | TEMPERATURE: 98.2 F | WEIGHT: 186 LBS | DIASTOLIC BLOOD PRESSURE: 90 MMHG

## 2022-07-14 DIAGNOSIS — J02.9 SORE THROAT: Primary | ICD-10-CM

## 2022-07-14 DIAGNOSIS — R03.0 ELEVATED BLOOD PRESSURE READING: ICD-10-CM

## 2022-07-14 PROCEDURE — 99213 OFFICE O/P EST LOW 20 MIN: CPT | Performed by: FAMILY MEDICINE

## 2022-07-14 NOTE — PROGRESS NOTES
Chief Complaint   Patient presents with    Sore Throat     Weak, body aches, loss of voice, neg at home COVID test         Patient ID: Grace Granda is a 59 y o  male  Sore Throat   This is a new problem  The current episode started in the past 7 days  The problem has been gradually improving  Neither side of throat is experiencing more pain than the other  There has been no fever  The pain is at a severity of 3/10  The pain is mild  Associated symptoms include coughing and a hoarse voice  Pertinent negatives include no abdominal pain, congestion, diarrhea, drooling, ear discharge, ear pain, headaches, plugged ear sensation, neck pain, shortness of breath, stridor, swollen glands, trouble swallowing or vomiting  He has had no exposure to strep or mono  He has tried NSAIDs and acetaminophen for the symptoms  The treatment provided mild relief  Cough  This is a new problem  The current episode started in the past 7 days  The problem has been waxing and waning  The problem occurs hourly  The cough is productive of sputum  Associated symptoms include nasal congestion and a sore throat  Pertinent negatives include no chest pain, chills, ear congestion, ear pain, fever, headaches, heartburn, hemoptysis, myalgias, postnasal drip, rash, rhinorrhea, shortness of breath, sweats, weight loss or wheezing  Nothing aggravates the symptoms  negative home COVID test       The following portions of the patient's history were reviewed and updated as appropriate: allergies, current medications, past family history, past medical history, past social history, past surgical history and problem list     Review of Systems   Constitutional: Negative for chills, fever and weight loss  HENT: Positive for hoarse voice and sore throat  Negative for congestion, drooling, ear discharge, ear pain, postnasal drip, rhinorrhea and trouble swallowing  Respiratory: Positive for cough   Negative for hemoptysis, shortness of breath, wheezing and stridor  Cardiovascular: Negative for chest pain  Gastrointestinal: Negative for abdominal pain, diarrhea, heartburn and vomiting  Musculoskeletal: Negative for myalgias and neck pain  Skin: Negative for rash  Neurological: Negative for headaches  Current Outpatient Medications   Medication Sig Dispense Refill    fluticasone-salmeterol (Advair) 100-50 mcg/dose inhaler Inhale 1 puff 2 (two) times a day 60 blister 11     No current facility-administered medications for this visit  Objective:    /90 (BP Location: Left arm, Patient Position: Sitting, Cuff Size: Standard)   Pulse 76   Temp 98 2 °F (36 8 °C)   Resp 16   Ht 5' 11 5" (1 816 m)   Wt 84 4 kg (186 lb)   SpO2 97%   BMI 25 58 kg/m²        Physical Exam  Constitutional:       General: He is not in acute distress  Appearance: He is not ill-appearing  HENT:      Nose: No congestion or rhinorrhea  Mouth/Throat:      Pharynx: No oropharyngeal exudate or posterior oropharyngeal erythema  Cardiovascular:      Rate and Rhythm: Normal rate and regular rhythm  Pulmonary:      Effort: Pulmonary effort is normal  No respiratory distress  Breath sounds: No wheezing, rhonchi or rales  Neurological:      Mental Status: He is alert                   Assessment/Plan:         Diagnoses and all orders for this visit:    Sore throat    Still possibility of COVID  Had 4 vaccines - improving  No needs to quarantine since over 5 days   strict mask use for total 10 days   Elevated blood pressure reading  Monitor BP daily x 2 wks and sent us a report       rto in 2 m for BP check                            Zuleyka William MD

## 2022-08-08 DIAGNOSIS — I10 PRIMARY HYPERTENSION: Primary | ICD-10-CM

## 2022-08-08 PROBLEM — R03.0 ELEVATED BLOOD PRESSURE READING: Status: RESOLVED | Noted: 2022-04-10 | Resolved: 2022-08-08

## 2022-08-08 RX ORDER — AMLODIPINE BESYLATE 2.5 MG/1
2.5 TABLET ORAL DAILY
Qty: 30 TABLET | Refills: 5 | Status: SHIPPED | OUTPATIENT
Start: 2022-08-08 | End: 2023-02-06

## 2023-02-05 DIAGNOSIS — I10 PRIMARY HYPERTENSION: ICD-10-CM

## 2023-02-06 RX ORDER — AMLODIPINE BESYLATE 2.5 MG/1
TABLET ORAL
Qty: 90 TABLET | Refills: 1 | Status: SHIPPED | OUTPATIENT
Start: 2023-02-06

## 2023-03-16 DIAGNOSIS — R09.81 NASAL CONGESTION: Primary | ICD-10-CM

## 2023-03-28 ENCOUNTER — OFFICE VISIT (OUTPATIENT)
Dept: FAMILY MEDICINE CLINIC | Facility: CLINIC | Age: 58
End: 2023-03-28

## 2023-03-28 VITALS
HEIGHT: 72 IN | HEART RATE: 76 BPM | BODY MASS INDEX: 26.55 KG/M2 | DIASTOLIC BLOOD PRESSURE: 80 MMHG | TEMPERATURE: 97.7 F | WEIGHT: 196 LBS | SYSTOLIC BLOOD PRESSURE: 120 MMHG | OXYGEN SATURATION: 98 % | RESPIRATION RATE: 14 BRPM

## 2023-03-28 DIAGNOSIS — M79.602 PAIN OF LEFT UPPER EXTREMITY: ICD-10-CM

## 2023-03-28 DIAGNOSIS — R07.9 CHEST PAIN, UNSPECIFIED TYPE: Primary | ICD-10-CM

## 2023-03-28 NOTE — PROGRESS NOTES
"Chief Complaint   Patient presents with   • Chest Pain     Pt c/o chest discomfort numbness in left arm x1 week        Patient ID: Angela Em is a 59 y o  male  HPI   pt is seeing for upper L chest pain with radiation down to L arm -  Started 1 wk ago -  Constant -  Worsening in am -  No Sob, last efw days noticed L 5th finger numbness -  No therapy tried, admits heavy lifting     The following portions of the patient's history were reviewed and updated as appropriate: allergies, current medications, past family history, past medical history, past social history, past surgical history and problem list     Review of Systems   Constitutional: Negative  Respiratory: Negative  Cardiovascular: Positive for chest pain  Negative for palpitations and leg swelling  Gastrointestinal: Negative  Musculoskeletal: Positive for myalgias  Skin: Negative  Neurological: Positive for numbness (L 5th finger )  Negative for weakness  Current Outpatient Medications   Medication Sig Dispense Refill   • amLODIPine (NORVASC) 2 5 mg tablet TAKE 1 TABLET BY MOUTH EVERY DAY 90 tablet 1   • fluticasone-salmeterol (Advair) 100-50 mcg/dose inhaler Inhale 1 puff 2 (two) times a day 60 blister 11     No current facility-administered medications for this visit  Objective:    /80 (BP Location: Left arm, Patient Position: Sitting, Cuff Size: Adult)   Pulse 76   Temp 97 7 °F (36 5 °C) (Temporal)   Resp 14   Ht 5' 11 5\" (1 816 m)   Wt 88 9 kg (196 lb)   SpO2 98%   BMI 26 96 kg/m²        Physical Exam  Constitutional:       Appearance: He is not ill-appearing  Cardiovascular:      Rate and Rhythm: Normal rate and regular rhythm  Pulmonary:      Effort: Pulmonary effort is normal  No respiratory distress  Chest:      Chest wall: No mass, deformity, swelling or tenderness  Musculoskeletal:         General: No swelling or tenderness  Right lower leg: No edema  Left lower leg: No edema   " Skin:     Findings: No rash  Neurological:      Mental Status: He is alert                   Assessment/Plan:         Diagnoses and all orders for this visit:    Chest pain, unspecified type  -     POCT ECG    Pain of left upper extremity    Aleve OTC    rto prn                       Stefania Marie MD

## 2023-04-28 ENCOUNTER — OFFICE VISIT (OUTPATIENT)
Dept: OTOLARYNGOLOGY | Facility: CLINIC | Age: 58
End: 2023-04-28

## 2023-04-28 VITALS
DIASTOLIC BLOOD PRESSURE: 76 MMHG | TEMPERATURE: 97.6 F | HEIGHT: 72 IN | BODY MASS INDEX: 24.92 KG/M2 | WEIGHT: 184 LBS | SYSTOLIC BLOOD PRESSURE: 114 MMHG

## 2023-04-28 DIAGNOSIS — R09.81 NASAL CONGESTION: ICD-10-CM

## 2023-04-28 DIAGNOSIS — J31.0 RHINITIS, CHRONIC: ICD-10-CM

## 2023-04-28 DIAGNOSIS — J34.2 DEVIATED NASAL SEPTUM: ICD-10-CM

## 2023-04-28 DIAGNOSIS — J31.0 CHRONIC RHINOSINUSITIS: Primary | ICD-10-CM

## 2023-04-28 DIAGNOSIS — J34.3 HYPERTROPHY OF BOTH INFERIOR NASAL TURBINATES: ICD-10-CM

## 2023-04-28 DIAGNOSIS — J32.9 CHRONIC RHINOSINUSITIS: Primary | ICD-10-CM

## 2023-04-28 NOTE — PROGRESS NOTES
"[facility] [address] [facility] Otolaryngology  Otolaryngology -- Head and Neck Surgery New Patient Visit  Khadra Ponce is a 59 y o  who presents with a chief complaint of     Nose and sinuses: The patient is complaining of stuffy nose, facial pressure , however denied any history of sneezing, itchy eyes and nose or nasal bleeding  There is no history of nose or sinus surgeries  Also history of bronchial asthma  no recent CT scan sinuses  no allergy skin testing     Tried different types of nasal spray flonase, afrin for more than 6 months and nothing helped     Review of systems: Pertinent review of systems documented in the HPI  10 point ROS documented in a separate note, as necessary  Results reviewed; images from any scan have been personally reviewed: The past medical, surgical, social and family history have been reviewed as documented in today's record  Past Medical History:   Diagnosis Date   • Asthma    • Contact lens/glasses fitting     and glasses   • Tinnitus      Past Surgical History:   Procedure Laterality Date   • APPENDECTOMY      age 41   • TONSILLECTOMY     • WISDOM TOOTH EXTRACTION       Family History   Problem Relation Age of Onset   • Lung cancer Mother    • Cancer Mother         lung,brain   • No Known Problems Sister    • No Known Problems Brother    • No Known Problems Sister      Current Outpatient Medications on File Prior to Visit   Medication Sig Dispense Refill   • amLODIPine (NORVASC) 2 5 mg tablet TAKE 1 TABLET BY MOUTH EVERY DAY 90 tablet 1   • fluticasone-salmeterol (Advair) 100-50 mcg/dose inhaler Inhale 1 puff 2 (two) times a day 60 blister 11     No current facility-administered medications on file prior to visit        Physical exam:   /76 (BP Location: Left arm, Patient Position: Sitting, Cuff Size: Adult)   Temp 97 6 °F (36 4 °C) (Temporal)   Ht 5' 11 5\" (1 816 m)   Wt 83 5 kg (184 lb)   BMI 25 31 kg/m²   Head: " Atraumatic, no visible scalp lesions, parotid and submandibular salivary glands non-tender to palpation and without masses bilaterally  Neck:  No visible or palpable cervical lesions or lymphadenopathy, thyroid gland is normal in size and symmetry and without masses, normal laryngeal elevation with swallowing  Ears:    Right ear :  Auricle normal in appearance, mastoid prominence non-tender, external auditory canal clear  Tympanic membranes intact  Left ear :  Auricle normal in appearance, mastoid prominence non-tender, external auditory canal clear   Tympanic membranes intact  Nose/Sinuses:  External appearance unremarkable, no maxillary or frontal sinus tenderness to palpation bilaterally  Nasal endoscopic examination showed deviated nasal septum, bilateral enlarged inferior turbinates, polypoid changes, thick yellow mucus, middle, superior meatus and sphenoethmoid recess yellow    Oral Cavity:  Moist mucus membranes, gums and dentition unremarkable, no oral mucosal masses or lesions, floor of mouth soft, tongue mobile without masses or lesions  Oropharynx:  Base of tongue soft and without masses, tonsils bilaterally unremarkable, soft palate mucosa unremarkable  Eyes:  Extra-ocular movements intact, pupils equally round and reactive to light and accommodation, the lids and conjunctivae are normal in appearance  Constitutional:  Well developed, well nourished and groomed, in no acute distress  Cardiovascular:  Normal rate and rhythm, no palpable thrills, no jugulovenous distension observed  Respiratory:  Normal respiratory effort without evidence of retractions or use of accessory muscles  Neurologic:  Cranial nerves II-XII intact bilaterally  Abdomen: Soft and lax  Extremities: No bruises   Psychiatric:  Alert and oriented to time, place and person  Procedures  Rigid nasal endoscopic examination:  The nasal cavities were decongested with lidocaine and oxymetazoline spray    Bilateral nasal endoscopy was performed as follows:  Endoscopy type: 0 degree rigid scope  Results: look above  The patient tolerated the procedure well  Assessment:   1  Nasal congestion  Ambulatory Referral to Otolaryngology        Orders  No orders of the defined types were placed in this encounter  Discussion/Plan:      Chronic rhinosinusitis, Rhinitis and Post nasal drip   Discussed treatment options including use of saline rinses, nasal steroids, allergy medications, allergy testing, imaging, and further surgical interventions  Given instructions on use of nasal steroids, saline rinses and allergy medications  CT scan ordered      If CT scan is clear well plan for septoplasty + caudal reconstruction + ITR

## 2023-05-03 ENCOUNTER — HOSPITAL ENCOUNTER (OUTPATIENT)
Dept: RADIOLOGY | Facility: HOSPITAL | Age: 58
Discharge: HOME | End: 2023-05-03

## 2023-05-03 DIAGNOSIS — R09.81 NASAL CONGESTION: ICD-10-CM

## 2023-05-15 ENCOUNTER — OFFICE VISIT (OUTPATIENT)
Dept: OTOLARYNGOLOGY | Facility: CLINIC | Age: 58
End: 2023-05-15

## 2023-05-15 VITALS — TEMPERATURE: 97.3 F | BODY MASS INDEX: 24.79 KG/M2 | WEIGHT: 183 LBS | HEIGHT: 72 IN

## 2023-05-15 DIAGNOSIS — J31.0 RHINITIS, CHRONIC: ICD-10-CM

## 2023-05-15 DIAGNOSIS — J31.0 CHRONIC RHINOSINUSITIS: Primary | ICD-10-CM

## 2023-05-15 DIAGNOSIS — J34.2 DEVIATED NASAL SEPTUM: ICD-10-CM

## 2023-05-15 DIAGNOSIS — J34.3 HYPERTROPHY OF BOTH INFERIOR NASAL TURBINATES: ICD-10-CM

## 2023-05-15 DIAGNOSIS — J32.9 CHRONIC RHINOSINUSITIS: Primary | ICD-10-CM

## 2023-05-15 RX ORDER — PREDNISONE 10 MG/1
TABLET ORAL
Qty: 21 TABLET | Refills: 0 | Status: SHIPPED | OUTPATIENT
Start: 2023-05-15

## 2023-05-15 RX ORDER — AMOXICILLIN AND CLAVULANATE POTASSIUM 875; 125 MG/1; MG/1
1 TABLET, FILM COATED ORAL EVERY 12 HOURS SCHEDULED
Qty: 28 TABLET | Refills: 0 | Status: SHIPPED | OUTPATIENT
Start: 2023-05-15 | End: 2023-05-29

## 2023-05-15 NOTE — PROGRESS NOTES
Otolaryngology-- Head and Neck Surgery Follow up visit      Follow up:    23:    Hunter Warren is a 59 y o  who presents with a chief complaint of   Nose and sinuses: The patient is complaining of stuffy nose, facial pressure , however denied any history of sneezing, itchy eyes and nose or nasal bleeding  There is no history of nose or sinus surgeries  Also history of bronchial asthma  no recent CT scan sinuses  no allergy skin testing   Tried different types of nasal sprays  5/15/23:  Here for CT scan results review  CT scan showed deviated nasal septum, bilateral enlarged inferior turbinates and pansinusitis    Review of any relevant imaging:      Interval Review of systems: Pertinent review of systems documented in the HPI      Interval Social History:  Social History     Socioeconomic History   • Marital status: /Civil Union     Spouse name: Not on file   • Number of children: Not on file   • Years of education: Not on file   • Highest education level: Not on file   Occupational History   • Not on file   Tobacco Use   • Smoking status: Former     Packs/day: 1 00     Years: 10 00     Pack years: 10 00     Types: Cigarettes     Start date: 1998     Quit date: 2016     Years since quittin 7   • Smokeless tobacco: Never   • Tobacco comments:     vapes daily   Vaping Use   • Vaping Use: Every day   • Substances: Nicotine   Substance and Sexual Activity   • Alcohol use: Not Currently     Alcohol/week: 1 0 standard drink     Types: 1 Standard drinks or equivalent per week     Comment: occ   • Drug use: Never   • Sexual activity: Yes     Partners: Female     Birth control/protection: None   Other Topics Concern   • Not on file   Social History Narrative   • Not on file     Social Determinants of Health     Financial Resource Strain: Not on file   Food Insecurity: Not on file   Transportation Needs: Not on file   Physical Activity: Not on file   Stress: Not on file   Social "Connections: Not on file   Intimate Partner Violence: Not on file   Housing Stability: Not on file       Interval Physical Examination:  Temp (!) 97 3 °F (36 3 °C) (Temporal)   Ht 5' 11 5\" (1 816 m)   Wt 83 kg (183 lb)   BMI 25 17 kg/m²   Head: Atraumatic, no visible scalp lesions, parotid and submandibular salivary glands non-tender to palpation and without masses bilaterally  Neck:  No visible or palpable cervical lesions or lymphadenopathy, thyroid gland is normal in size and symmetry and without masses, normal laryngeal elevation with swallowing  Ears:    Right ear :  Auricle normal in appearance, mastoid prominence non-tender, external auditory canal clear  Tympanic membranes intact  Left ear :  Auricle normal in appearance, mastoid prominence non-tender, external auditory canal clear   Tympanic membranes intact  Nose/Sinuses:  External appearance unremarkable, no maxillary or frontal sinus tenderness to palpation bilaterally  Nasal endoscopic examination showed deviated nasal septum, bilateral enlarged inferior turbinates, polypoid changes, thick yellow mucus, middle, superior meatus and sphenoethmoid recess yellow     Oral Cavity:  Moist mucus membranes, gums and dentition unremarkable, no oral mucosal masses or lesions, floor of mouth soft, tongue mobile without masses or lesions  Oropharynx:  Base of tongue soft and without masses, tonsils bilaterally unremarkable, soft palate mucosa unremarkable       Eyes:  Extra-ocular movements intact, pupils equally round and reactive to light and accommodation, the lids and conjunctivae are normal in appearance  Constitutional:  Well developed, well nourished and groomed, in no acute distress  Cardiovascular:  Normal rate and rhythm, no palpable thrills, no jugulovenous distension observed  Respiratory:  Normal respiratory effort without evidence of retractions or use of accessory muscles    Neurologic:  Cranial nerves II-XII intact " bilaterally  Abdomen: Soft and lax  Extremities: No bruises   Psychiatric:  Alert and oriented to time, place and person  Procedures  Rigid nasal endoscopic examination:  The nasal cavities were decongested with lidocaine and oxymetazoline spray  Bilateral nasal endoscopy was performed as follows:  Endoscopy type: 0 degree rigid scope  Results: look above  The patient tolerated the procedure well        Assessment:  1  Chronic rhinosinusitis  amoxicillin-clavulanate (AUGMENTIN) 875-125 mg per tablet    predniSONE 10 mg tablet      2  Hypertrophy of both inferior nasal turbinates        3  Deviated nasal septum        4  Rhinitis, chronic            Plan:    Chronic rhinosinusitis, Rhinitis and Post nasal drip   Discussed treatment options including use of saline rinses, nasal steroids, allergy medications, allergy testing, imaging, and further surgical interventions  Given instructions on use of nasal steroids, saline rinses and allergy medications  CT scan reviewed    Augmentin and Prednisolone   If no improvement we will refer her to [address] [place] [address] for sinus surgery

## 2023-06-05 DIAGNOSIS — J45.40 MODERATE PERSISTENT ASTHMA WITHOUT COMPLICATION: ICD-10-CM

## 2023-06-05 RX ORDER — FLUTICASONE PROPIONATE AND SALMETEROL 100; 50 UG/1; UG/1
1 POWDER RESPIRATORY (INHALATION) 2 TIMES DAILY
Qty: 60 BLISTER | Refills: 1 | Status: SHIPPED | OUTPATIENT
Start: 2023-06-05

## 2023-06-09 ENCOUNTER — OFFICE VISIT (OUTPATIENT)
Dept: FAMILY MEDICINE CLINIC | Facility: CLINIC | Age: 58
End: 2023-06-09
Payer: COMMERCIAL

## 2023-06-09 VITALS
BODY MASS INDEX: 25.41 KG/M2 | TEMPERATURE: 98.4 F | WEIGHT: 187.6 LBS | SYSTOLIC BLOOD PRESSURE: 138 MMHG | HEIGHT: 72 IN | DIASTOLIC BLOOD PRESSURE: 92 MMHG | HEART RATE: 68 BPM | RESPIRATION RATE: 18 BRPM

## 2023-06-09 DIAGNOSIS — Z12.5 SCREENING FOR PROSTATE CANCER: ICD-10-CM

## 2023-06-09 DIAGNOSIS — Z00.00 ANNUAL PHYSICAL EXAM: Primary | ICD-10-CM

## 2023-06-09 PROCEDURE — 99396 PREV VISIT EST AGE 40-64: CPT | Performed by: FAMILY MEDICINE

## 2023-06-09 RX ORDER — FLUTICASONE PROPIONATE 50 MCG
1 SPRAY, SUSPENSION (ML) NASAL DAILY
COMMUNITY

## 2023-06-09 NOTE — PROGRESS NOTES
"Chief Complaint   Patient presents with   • Annual Exam        Patient ID: Steffany Billingsley is a 59 y o  male  HPI  Pt is seeing for annual PE    The following portions of the patient's history were reviewed and updated as appropriate: allergies, current medications, past family history, past medical history, past social history, past surgical history and problem list     Review of Systems   Constitutional: Negative  Negative for fatigue, fever and unexpected weight change  HENT: Negative for congestion, ear discharge, ear pain, hearing loss, rhinorrhea, sinus pressure, sore throat and trouble swallowing  Eyes: Negative  Respiratory: Negative  Cardiovascular: Negative  Gastrointestinal: Negative  Endocrine: Negative  Genitourinary: Negative  Musculoskeletal: Negative  Skin: Negative  Neurological: Negative for dizziness, weakness, light-headedness and numbness  Hematological: Negative  Psychiatric/Behavioral: Negative  Current Outpatient Medications   Medication Sig Dispense Refill   • amLODIPine (NORVASC) 2 5 mg tablet TAKE 1 TABLET BY MOUTH EVERY DAY 90 tablet 1   • Fluticasone-Salmeterol (Advair Diskus) 100-50 mcg/dose inhaler Inhale 1 puff 2 (two) times a day 60 blister 1     No current facility-administered medications for this visit  Objective:    /92 (BP Location: Left arm, Patient Position: Sitting, Cuff Size: Large)   Pulse 68   Temp 98 4 °F (36 9 °C)   Resp 18   Ht 5' 11 5\" (1 816 m)   Wt 85 1 kg (187 lb 9 6 oz)   BMI 25 80 kg/m²        Physical Exam  Constitutional:       General: He is not in acute distress  Appearance: Normal appearance  He is well-developed  He is not ill-appearing  HENT:      Head: Normocephalic and atraumatic  Right Ear: Hearing, tympanic membrane, ear canal and external ear normal       Left Ear: Hearing, tympanic membrane, ear canal and external ear normal       Nose: No congestion or rhinorrhea        " Mouth/Throat:      Pharynx: No oropharyngeal exudate or posterior oropharyngeal erythema  Eyes:      Extraocular Movements: Extraocular movements intact  Conjunctiva/sclera: Conjunctivae normal    Neck:      Thyroid: No thyroid mass or thyromegaly  Vascular: No JVD  Cardiovascular:      Rate and Rhythm: Normal rate and regular rhythm  Heart sounds: Normal heart sounds  No murmur heard  No gallop  Pulmonary:      Effort: No respiratory distress  Breath sounds: Normal breath sounds  No wheezing, rhonchi or rales  Abdominal:      General: Bowel sounds are normal       Palpations: Abdomen is soft  Tenderness: There is no abdominal tenderness  There is no right CVA tenderness or left CVA tenderness  Musculoskeletal:         General: No tenderness  Cervical back: Neck supple  Right lower leg: No edema  Left lower leg: No edema  Lymphadenopathy:      Cervical: No cervical adenopathy  Skin:     Coloration: Skin is not pale  Findings: No rash  Neurological:      General: No focal deficit present  Mental Status: He is alert and oriented to person, place, and time  Cranial Nerves: No cranial nerve deficit  Psychiatric:         Mood and Affect: Mood normal          Behavior: Behavior normal          Thought Content: Thought content normal          Judgment: Judgment normal            Labs in chart were reviewed  Assessment/Plan:         Diagnoses and all orders for this visit:    Annual physical exam  -     CBC; Future  -     Comprehensive metabolic panel; Future  -     Hemoglobin A1C; Future  -     Lipid panel; Future  -     TSH, 3rd generation; Future  -     PSA, Total Screen; Future    Screening for prostate cancer  -     PSA, Total Screen;  Future    Other orders  -     fluticasone (FLONASE) 50 mcg/act nasal spray; 1 spray into each nostril daily                          Quinton Winslow MD

## 2023-06-13 LAB
BASOPHILS # BLD AUTO: 83 CELLS/UL (ref 0–200)
BASOPHILS NFR BLD AUTO: 1.2 %
CHOLEST SERPL-MCNC: 183 MG/DL
CHOLEST/HDLC SERPL: 5.4 (CALC)
EOSINOPHIL # BLD AUTO: 573 CELLS/UL (ref 15–500)
EOSINOPHIL NFR BLD AUTO: 8.3 %
ERYTHROCYTE [DISTWIDTH] IN BLOOD BY AUTOMATED COUNT: 12.6 % (ref 11–15)
HCT VFR BLD AUTO: 49.3 % (ref 38.5–50)
HDLC SERPL-MCNC: 34 MG/DL
HGB BLD-MCNC: 16.9 G/DL (ref 13.2–17.1)
LDLC SERPL CALC-MCNC: 127 MG/DL (CALC)
LYMPHOCYTES # BLD AUTO: 1601 CELLS/UL (ref 850–3900)
LYMPHOCYTES NFR BLD AUTO: 23.2 %
MCH RBC QN AUTO: 31.2 PG (ref 27–33)
MCHC RBC AUTO-ENTMCNC: 34.3 G/DL (ref 32–36)
MCV RBC AUTO: 91.1 FL (ref 80–100)
MONOCYTES # BLD AUTO: 593 CELLS/UL (ref 200–950)
MONOCYTES NFR BLD AUTO: 8.6 %
NEUTROPHILS # BLD AUTO: 4050 CELLS/UL (ref 1500–7800)
NEUTROPHILS NFR BLD AUTO: 58.7 %
NONHDLC SERPL-MCNC: 149 MG/DL (CALC)
PLATELET # BLD AUTO: 248 THOUSAND/UL (ref 140–400)
PMV BLD REES-ECKER: 10.5 FL (ref 7.5–12.5)
PSA SERPL-MCNC: 0.42 NG/ML
RBC # BLD AUTO: 5.41 MILLION/UL (ref 4.2–5.8)
TRIGL SERPL-MCNC: 116 MG/DL
TSH SERPL-ACNC: 1.8 MIU/L (ref 0.4–4.5)
WBC # BLD AUTO: 6.9 THOUSAND/UL (ref 3.8–10.8)

## 2023-07-02 DIAGNOSIS — J45.40 MODERATE PERSISTENT ASTHMA WITHOUT COMPLICATION: ICD-10-CM

## 2023-07-03 RX ORDER — FLUTICASONE PROPIONATE AND SALMETEROL 100; 50 UG/1; UG/1
1 POWDER RESPIRATORY (INHALATION) 2 TIMES DAILY
Qty: 60 BLISTER | Refills: 3 | Status: SHIPPED | OUTPATIENT
Start: 2023-07-03

## 2023-07-31 ENCOUNTER — OFFICE VISIT (OUTPATIENT)
Dept: OTOLARYNGOLOGY | Facility: CLINIC | Age: 58
End: 2023-07-31
Payer: COMMERCIAL

## 2023-07-31 VITALS — WEIGHT: 179 LBS | HEIGHT: 72 IN | BODY MASS INDEX: 24.24 KG/M2 | TEMPERATURE: 97.2 F

## 2023-07-31 DIAGNOSIS — J34.2 DEVIATED NASAL SEPTUM: ICD-10-CM

## 2023-07-31 DIAGNOSIS — J31.0 RHINITIS, CHRONIC: Primary | ICD-10-CM

## 2023-07-31 DIAGNOSIS — J31.0 CHRONIC RHINOSINUSITIS: ICD-10-CM

## 2023-07-31 DIAGNOSIS — J32.9 CHRONIC RHINOSINUSITIS: ICD-10-CM

## 2023-07-31 DIAGNOSIS — J34.3 HYPERTROPHY OF BOTH INFERIOR NASAL TURBINATES: ICD-10-CM

## 2023-07-31 PROCEDURE — 99214 OFFICE O/P EST MOD 30 MIN: CPT | Performed by: STUDENT IN AN ORGANIZED HEALTH CARE EDUCATION/TRAINING PROGRAM

## 2023-07-31 PROCEDURE — 31231 NASAL ENDOSCOPY DX: CPT | Performed by: STUDENT IN AN ORGANIZED HEALTH CARE EDUCATION/TRAINING PROGRAM

## 2023-07-31 NOTE — PROGRESS NOTES
Otolaryngology-- Head and Neck Surgery Follow up visit      Follow up:    23:  Esdras Lechuga is a 59 y.o. who presents with a chief complaint of   Nose and sinuses: The patient is complaining of stuffy nose, facial pressure , however denied any history of sneezing, itchy eyes and nose or nasal bleeding. There is no history of nose or sinus surgeries. Also history of bronchial asthma. no recent CT scan sinuses  no allergy skin testing   Tried different types of nasal sprays.     5/15/23:  Here for CT scan results review  CT scan showed deviated nasal septum, bilateral enlarged inferior turbinates and pansinusitis      23:  CRS follow up finished Augmentin and Prednisone with no improvement       Review of any relevant imaging:      Interval Review of systems: Pertinent review of systems documented in the HPI.     Interval Social History:  Social History     Socioeconomic History   • Marital status: /Civil Union     Spouse name: Not on file   • Number of children: Not on file   • Years of education: Not on file   • Highest education level: Not on file   Occupational History   • Not on file   Tobacco Use   • Smoking status: Former     Packs/day: 1.00     Years: 10.00     Total pack years: 10.00     Types: Cigarettes     Start date: 1998     Quit date: 2016     Years since quittin.9   • Smokeless tobacco: Never   • Tobacco comments:     vapes daily   Vaping Use   • Vaping Use: Every day   • Substances: Nicotine   Substance and Sexual Activity   • Alcohol use: Not Currently     Alcohol/week: 1.0 standard drink of alcohol     Types: 1 Standard drinks or equivalent per week     Comment: occ   • Drug use: Never   • Sexual activity: Yes     Partners: Female     Birth control/protection: None   Other Topics Concern   • Not on file   Social History Narrative   • Not on file     Social Determinants of Health     Financial Resource Strain: Not on file   Food Insecurity: Not on file Transportation Needs: Not on file   Physical Activity: Not on file   Stress: Not on file   Social Connections: Not on file   Intimate Partner Violence: Not on file   Housing Stability: Not on file       Interval Physical Examination:  Temp (!) 97.2 °F (36.2 °C) (Temporal)   Ht 5' 11.5" (1.816 m)   Wt 81.2 kg (179 lb)   BMI 24.62 kg/m²     Head: Atraumatic, no visible scalp lesions, parotid and submandibular salivary glands non-tender to palpation and without masses bilaterally. Neck:  No visible or palpable cervical lesions or lymphadenopathy, thyroid gland is normal in size and symmetry and without masses, normal laryngeal elevation with swallowing. Ears:    Right ear :  Auricle normal in appearance, mastoid prominence non-tender, external auditory canal clear. Tympanic membranes intact. Left ear :  Auricle normal in appearance, mastoid prominence non-tender, external auditory canal clear . Tympanic membranes intact. Nose/Sinuses:  External appearance unremarkable, no maxillary or frontal sinus tenderness to palpation bilaterally. Nasal endoscopic examination showed deviated nasal septum, bilateral enlarged inferior turbinates, polypoid changes, thick yellow mucus, middle, superior meatus and sphenoethmoid recess yellow     Oral Cavity:  Moist mucus membranes, gums and dentition unremarkable, no oral mucosal masses or lesions, floor of mouth soft, tongue mobile without masses or lesions. Oropharynx:  Base of tongue soft and without masses, tonsils bilaterally unremarkable, soft palate mucosa unremarkable.      Eyes:  Extra-ocular movements intact, pupils equally round and reactive to light and accommodation, the lids and conjunctivae are normal in appearance. Constitutional:  Well developed, well nourished and groomed, in no acute distress. Cardiovascular:  Normal rate and rhythm, no palpable thrills, no jugulovenous distension observed.   Respiratory:  Normal respiratory effort without evidence of retractions or use of accessory muscles. Neurologic:  Cranial nerves II-XII intact bilaterally. Abdomen: Soft and lax  Extremities: No bruises   Psychiatric:  Alert and oriented to time, place and person. Procedures  Rigid nasal endoscopic examination:  The nasal cavities were decongested with lidocaine and oxymetazoline spray.  Bilateral nasal endoscopy was performed as follows:  Endoscopy type: 0 degree rigid scope  Results: look above  The patient tolerated the procedure well. Assessment:  1. Rhinitis, chronic        2. Chronic rhinosinusitis        3. Hypertrophy of both inferior nasal turbinates        4. Deviated nasal septum            Plan:    CRS did not improve with Abx and steroids.  Will refer to [address] for sinus surgery

## 2023-08-11 DIAGNOSIS — I10 PRIMARY HYPERTENSION: ICD-10-CM

## 2023-08-11 RX ORDER — AMLODIPINE BESYLATE 2.5 MG/1
TABLET ORAL
Qty: 90 TABLET | Refills: 1 | Status: SHIPPED | OUTPATIENT
Start: 2023-08-11

## 2023-10-16 ENCOUNTER — APPOINTMENT (OUTPATIENT)
Dept: LAB | Facility: HOSPITAL | Age: 58
End: 2023-10-16
Attending: OTOLARYNGOLOGY
Payer: COMMERCIAL

## 2023-10-16 ENCOUNTER — APPOINTMENT (OUTPATIENT)
Dept: LAB | Facility: HOSPITAL | Age: 58
End: 2023-10-16

## 2023-10-16 DIAGNOSIS — Z01.818 PRE-OP TESTING: ICD-10-CM

## 2023-10-16 LAB
ANION GAP SERPL CALCULATED.3IONS-SCNC: 5 MMOL/L
ATRIAL RATE: 60 BPM
BASOPHILS # BLD AUTO: 0.08 THOUSANDS/ÂΜL (ref 0–0.1)
BASOPHILS NFR BLD AUTO: 1 % (ref 0–1)
BUN SERPL-MCNC: 18 MG/DL (ref 5–25)
CALCIUM SERPL-MCNC: 8.8 MG/DL (ref 8.4–10.2)
CHLORIDE SERPL-SCNC: 105 MMOL/L (ref 96–108)
CO2 SERPL-SCNC: 30 MMOL/L (ref 21–32)
CREAT SERPL-MCNC: 1.12 MG/DL (ref 0.6–1.3)
EOSINOPHIL # BLD AUTO: 0.48 THOUSAND/ÂΜL (ref 0–0.61)
EOSINOPHIL NFR BLD AUTO: 4 % (ref 0–6)
ERYTHROCYTE [DISTWIDTH] IN BLOOD BY AUTOMATED COUNT: 13.2 % (ref 11.6–15.1)
GFR SERPL CREATININE-BSD FRML MDRD: 71 ML/MIN/1.73SQ M
GLUCOSE SERPL-MCNC: 89 MG/DL (ref 65–140)
HCT VFR BLD AUTO: 48.9 % (ref 36.5–49.3)
HGB BLD-MCNC: 16.3 G/DL (ref 12–17)
IMM GRANULOCYTES # BLD AUTO: 0.14 THOUSAND/UL (ref 0–0.2)
IMM GRANULOCYTES NFR BLD AUTO: 1 % (ref 0–2)
LYMPHOCYTES # BLD AUTO: 3.56 THOUSANDS/ÂΜL (ref 0.6–4.47)
LYMPHOCYTES NFR BLD AUTO: 31 % (ref 14–44)
MCH RBC QN AUTO: 31.5 PG (ref 26.8–34.3)
MCHC RBC AUTO-ENTMCNC: 33.3 G/DL (ref 31.4–37.4)
MCV RBC AUTO: 94 FL (ref 82–98)
MONOCYTES # BLD AUTO: 0.94 THOUSAND/ÂΜL (ref 0.17–1.22)
MONOCYTES NFR BLD AUTO: 8 % (ref 4–12)
NEUTROPHILS # BLD AUTO: 6.27 THOUSANDS/ÂΜL (ref 1.85–7.62)
NEUTS SEG NFR BLD AUTO: 55 % (ref 43–75)
NRBC BLD AUTO-RTO: 0 /100 WBCS
P AXIS: -19 DEGREES
PLATELET # BLD AUTO: 258 THOUSANDS/UL (ref 149–390)
PMV BLD AUTO: 9.7 FL (ref 8.9–12.7)
POTASSIUM SERPL-SCNC: 3.6 MMOL/L (ref 3.5–5.3)
PR INTERVAL: 138 MS
QRS AXIS: -24 DEGREES
QRSD INTERVAL: 88 MS
QT INTERVAL: 382 MS
QTC INTERVAL: 382 MS
RBC # BLD AUTO: 5.18 MILLION/UL (ref 3.88–5.62)
SODIUM SERPL-SCNC: 140 MMOL/L (ref 135–147)
T WAVE AXIS: 20 DEGREES
VENTRICULAR RATE: 60 BPM
WBC # BLD AUTO: 11.47 THOUSAND/UL (ref 4.31–10.16)

## 2023-10-16 PROCEDURE — 36415 COLL VENOUS BLD VENIPUNCTURE: CPT

## 2023-10-16 PROCEDURE — 85025 COMPLETE CBC W/AUTO DIFF WBC: CPT

## 2023-10-16 PROCEDURE — 80048 BASIC METABOLIC PNL TOTAL CA: CPT

## 2023-10-20 DIAGNOSIS — J45.40 MODERATE PERSISTENT ASTHMA WITHOUT COMPLICATION: ICD-10-CM

## 2023-10-20 RX ORDER — FLUTICASONE PROPIONATE AND SALMETEROL 50; 100 UG/1; UG/1
POWDER RESPIRATORY (INHALATION) 2 TIMES DAILY
Qty: 60 BLISTER | Refills: 3 | Status: SHIPPED | OUTPATIENT
Start: 2023-10-20

## 2023-11-20 ENCOUNTER — OFFICE VISIT (OUTPATIENT)
Dept: FAMILY MEDICINE CLINIC | Facility: CLINIC | Age: 58
End: 2023-11-20
Payer: COMMERCIAL

## 2023-11-20 VITALS
HEIGHT: 72 IN | OXYGEN SATURATION: 99 % | TEMPERATURE: 97.6 F | DIASTOLIC BLOOD PRESSURE: 84 MMHG | BODY MASS INDEX: 25.81 KG/M2 | HEART RATE: 90 BPM | WEIGHT: 190.6 LBS | RESPIRATION RATE: 16 BRPM | SYSTOLIC BLOOD PRESSURE: 138 MMHG

## 2023-11-20 DIAGNOSIS — M25.511 ACUTE PAIN OF RIGHT SHOULDER: Primary | ICD-10-CM

## 2023-11-20 DIAGNOSIS — J45.40 MODERATE PERSISTENT ASTHMA WITHOUT COMPLICATION: ICD-10-CM

## 2023-11-20 PROCEDURE — 99214 OFFICE O/P EST MOD 30 MIN: CPT | Performed by: STUDENT IN AN ORGANIZED HEALTH CARE EDUCATION/TRAINING PROGRAM

## 2023-11-20 RX ORDER — MELOXICAM 15 MG/1
15 TABLET ORAL DAILY
Qty: 21 TABLET | Refills: 0 | Status: SHIPPED | OUTPATIENT
Start: 2023-11-20

## 2023-11-20 NOTE — PROGRESS NOTES
Clinic Visit Note  Denver Ellis 59 y.o. male   MRN: [id number]    Assessment and Plan      Diagnoses and all orders for this visit:    Acute pain of right shoulder  Patient does have overall good active range of motion but limited due to pain, concern for rotator cuff or labrum etiology. Recommend orthopedic evaluation, if overall stable recommend physical therapy. X-ray imaging, will trial meloxicam daily, if we do need increased pain regimen recommend tramadol.  -     XR shoulder 2+ vw right; Future  -     Ambulatory Referral to Orthopedic Surgery; Future  -     meloxicam (Mobic) 15 mg tablet; Take 1 tablet (15 mg total) by mouth daily    Moderate persistent asthma without complication  Continue Advair Diskus    My impressions and treatment recommendations were discussed in detail with the patient who verbalized understanding and had no further questions. Discharge instructions were provided. Subjective     Chief Complaint: Acute care visit    History of Present Illness:    Patient is a pleasant 66-year-old male coming in for acute care visit secondary to acute pain of right shoulder after unconsciously getting pulled by dog with leash, did hear a popping sensation at time. Very sore at the beginning of the day but once he starts moving joint it does feel better, having a hard time sleeping. The following portions of the patient's history were reviewed and updated as appropriate: allergies, current medications, past family history, past medical history, past social history, past surgical history and problem list.    REVIEW OF SYSTEMS:  A complete 12-point review of systems is negative other than that noted in the HPI. Review of Systems   Constitutional:  Negative for chills, fatigue and fever. Respiratory:  Negative for cough, shortness of breath and wheezing. Cardiovascular:  Negative for chest pain, palpitations and leg swelling.    Musculoskeletal:  Negative for back pain, joint swelling, neck pain and neck stiffness. Neurological:  Negative for dizziness and headaches. Current Outpatient Medications:   •  amLODIPine (NORVASC) 2.5 mg tablet, TAKE 1 TABLET BY MOUTH EVERY DAY, Disp: 90 tablet, Rfl: 1  •  fluticasone (FLONASE) 50 mcg/act nasal spray, 1 spray into each nostril daily, Disp: , Rfl:   •  meloxicam (Mobic) 15 mg tablet, Take 1 tablet (15 mg total) by mouth daily, Disp: 21 tablet, Rfl: 0  •  Mometasone Furoate POWD, Compound Mometasone 1 mg capsule to be added to sinus rinse twice daily, Disp: 180 g, Rfl: 3  •  Fluticasone-Salmeterol (Advair Diskus) 100-50 mcg/dose inhaler, INHALE 1 DOSE BY MOUTH TWICE DAILY.  RINSE MOUTH AFTER USE, Disp: 60 blister, Rfl: 3  Past Medical History:   Diagnosis Date   • Asthma    • Contact lens/glasses fitting     and glasses   • Hypertension    • Tinnitus      Past Surgical History:   Procedure Laterality Date   • APPENDECTOMY      age 41   • TONSILLECTOMY     • WISDOM TOOTH EXTRACTION       Social History     Socioeconomic History   • Marital status: /Civil Union     Spouse name: Not on file   • Number of children: Not on file   • Years of education: Not on file   • Highest education level: Not on file   Occupational History   • Not on file   Tobacco Use   • Smoking status: Former     Packs/day: 1.00     Years: 10.00     Total pack years: 10.00     Types: Cigarettes     Start date: 1998     Quit date: 2016     Years since quittin.2   • Smokeless tobacco: Never   • Tobacco comments:     vapes daily   Vaping Use   • Vaping Use: Every day   • Substances: Nicotine   Substance and Sexual Activity   • Alcohol use: Not Currently     Alcohol/week: 1.0 standard drink of alcohol     Types: 1 Standard drinks or equivalent per week     Comment: occ   • Drug use: Never   • Sexual activity: Yes     Partners: Female     Birth control/protection: None   Other Topics Concern   • Not on file   Social History Narrative   • Not on file     Social Determinants of Health     Financial Resource Strain: Not on file   Food Insecurity: Not on file   Transportation Needs: Not on file   Physical Activity: Not on file   Stress: Not on file   Social Connections: Not on file   Intimate Partner Violence: Not on file   Housing Stability: Not on file     Family History   Problem Relation Age of Onset   • Lung cancer Mother    • Cancer Mother         Jakub Frost   • No Known Problems Sister    • No Known Problems Brother    • No Known Problems Sister      No Known Allergies    Objective     Vitals:    11/20/23 1527   BP: 138/84   BP Location: Left arm   Patient Position: Sitting   Cuff Size: Large   Pulse: 90   Resp: 16   Temp: 97.6 °F (36.4 °C)   SpO2: 99%   Weight: 86.5 kg (190 lb 9.6 oz)   Height: 5' 11.5" (1.816 m)       Physical Exam:     GENERAL: NAD, pleasant   HEENT:  NC/AT, PERRL, EOMI, no scleral icterus  CARDIAC:  RRR, +S1/S2, no S3/S4 appreciated, no m/g/r  PULMONARY:  CTA B/L, no wheezing/rales/rhonci, non-labored breathing  ABDOMEN:  Soft, NT/ND, no rebound/guarding/rigidity  Extremities:. No edema, cyanosis, or clubbing  Musculoskeletal:  Full range of motion intact in all extremities   NEUROLOGIC: Grossly intact, no focal deficits  SKIN:  No rashes or erythema noted on exposed skin  Psych: Normal affect, mood stable    Negative empty can testing on right side, some limited active motion in abduction    ==  PLEASE NOTE:  This encounter was completed utilizing the [facility] Direct Speech Voice Recognition Software. Grammatical errors, random word insertions, pronoun errors and incomplete sentences are occasional consequences of the system due to software limitations, ambient noise and hardware issues. These may be missed by proof reading prior to affixing electronic signature. Any questions or concerns about the content, text or information contained within the body of this dictation should be directly addressed to the physician for clarification.  Please do not hesitate to call me directly if you have any any questions or concerns.     Caryl Santiago, DO [facility]   [facility]

## 2023-11-21 ENCOUNTER — OFFICE VISIT (OUTPATIENT)
Dept: OBGYN CLINIC | Facility: CLINIC | Age: 58
End: 2023-11-21
Payer: COMMERCIAL

## 2023-11-21 ENCOUNTER — APPOINTMENT (OUTPATIENT)
Dept: RADIOLOGY | Facility: CLINIC | Age: 58
End: 2023-11-21
Payer: COMMERCIAL

## 2023-11-21 VITALS
WEIGHT: 190 LBS | HEART RATE: 75 BPM | SYSTOLIC BLOOD PRESSURE: 157 MMHG | HEIGHT: 71 IN | DIASTOLIC BLOOD PRESSURE: 97 MMHG | BODY MASS INDEX: 26.6 KG/M2

## 2023-11-21 DIAGNOSIS — M24.811 INTERNAL DERANGEMENT OF RIGHT SHOULDER: Primary | ICD-10-CM

## 2023-11-21 DIAGNOSIS — M25.511 ACUTE PAIN OF RIGHT SHOULDER: ICD-10-CM

## 2023-11-21 PROCEDURE — 99204 OFFICE O/P NEW MOD 45 MIN: CPT | Performed by: ORTHOPAEDIC SURGERY

## 2023-11-21 PROCEDURE — 73030 X-RAY EXAM OF SHOULDER: CPT

## 2023-11-21 NOTE — PROGRESS NOTES
Orthopedic Sports Medicine New Patient Visit     Assesment:   59 y.o. male with right shoulder internal derangement, concern for acute traumatic rotator cuff tear    Plan:    The patient had a normal shoulder with no pain and normal function. He then had 2 acute injuries in close proximity. He now has significant pain and shoulder dysfunction. He also has significant weakness on exam with rotator cuff testing. X-rays reviewed show no significant degenerative changes and no fractures. I recommend an MRI to evaluate his rotator cuff as well as other articular structures for evidence of acute injury that may benefit from acute surgical intervention. I will see him back after the MRI to discuss results and additional treatment plan. History of Present Illness: The patient is a 59 y.o. male who presents with pain in the right shoulder. His shoulder was normal with no pain and no issues with function prior to acute injury. Roughly 1 week ago his great Rosendo pulled his leash firmly causing a pop and acute pain in the shoulder. Several days later the patient was working with some lawn equipment when it was dropped and he had to catch it. This produced another pop in the shoulder further worsening of his pain. At this point he struggles with any overhead or reaching activity. Pain is localized to the lateral aspect of the shoulder with radiation to the mid humerus. He denies any numbness or tingling as well as any neck pain. 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     Shoulder Surgical History:  Non  Past Medical, Social and Family History:  Past Medical History:   Diagnosis Date    Asthma     Contact lens/glasses fitting     and glasses    Hypertension     Tinnitus      Past Surgical History:   Procedure Laterality Date    APPENDECTOMY      age 41    TONSILLECTOMY      WISDOM TOOTH EXTRACTION       No Known Allergies  Current Outpatient Medications on File Prior to Visit   Medication Sig Dispense Refill    amLODIPine (NORVASC) 2.5 mg tablet TAKE 1 TABLET BY MOUTH EVERY DAY 90 tablet 1    fluticasone (FLONASE) 50 mcg/act nasal spray 1 spray into each nostril daily      Fluticasone-Salmeterol (Advair Diskus) 100-50 mcg/dose inhaler INHALE 1 DOSE BY MOUTH TWICE DAILY. RINSE MOUTH AFTER USE 60 blister 3    meloxicam (Mobic) 15 mg tablet Take 1 tablet (15 mg total) by mouth daily 21 tablet 0    Mometasone Furoate POWD Compound Mometasone 1 mg capsule to be added to sinus rinse twice daily 180 g 3     No current facility-administered medications on file prior to visit.      Social History     Socioeconomic History    Marital status: /Civil Union     Spouse name: Not on file    Number of children: Not on file    Years of education: Not on file    Highest education level: Not on file   Occupational History    Not on file   Tobacco Use    Smoking status: Former     Packs/day: 1.00     Years: 10.00     Total pack years: 10.00     Types: Cigarettes     Start date: 1998     Quit date: 2016     Years since quittin.2    Smokeless tobacco: Never    Tobacco comments:     vapes daily   Vaping Use    Vaping Use: Every day    Substances: Nicotine   Substance and Sexual Activity    Alcohol use: Not Currently     Alcohol/week: 1.0 standard drink of alcohol     Types: 1 Standard drinks or equivalent per week     Comment: occ    Drug use: Never    Sexual activity: Yes     Partners: Female     Birth control/protection: None   Other Topics Concern    Not on file   Social History Narrative    Not on file     Social Determinants of Health     Financial Resource Strain: Not on file   Food Insecurity: Not on file   Transportation Needs: Not on file   Physical Activity: Not on file   Stress: Not on file   Social Connections: Not on file   Intimate Partner Violence: Not on file   Housing Stability: Not on file         I have reviewed the past medical, surgical, social and family history, medications and allergies as documented in the EMR. Review of systems: ROS is negative other than that noted in the HPI. Constitutional: Negative for fatigue and fever. HENT: Negative for sore throat. Respiratory: Negative for shortness of breath. Cardiovascular: Negative for chest pain. Gastrointestinal: Negative for abdominal pain. Endocrine: Negative for cold intolerance and heat intolerance. Genitourinary: Negative for flank pain. Musculoskeletal: Negative for back pain. Skin: Negative for rash. Allergic/Immunologic: Negative for immunocompromised state. Neurological: Negative for dizziness. Psychiatric/Behavioral: Negative for agitation. Physical Exam:    There were no vitals taken for this visit. General/Constitutional: NAD, well developed, well nourished  HENT: Normocephalic, atraumatic  CV: Intact distal pulses, regular rate  Resp: No respiratory distress or labored breathing  Abdomen: soft, nondistended, non tender   Lymphatic: No lymphadenopathy palpated  Neuro: Alert and Oriented x 3, no focal deficits  Psych: Normal mood, normal affect  Skin: Warm, dry, no rashes, no erythema      Shoulder Exam:      Inspection: No ecchymosis, edema, or deformity. No visualized wounds or skin lesions   Palpation: Moderate tenderness over the greater tuberosity, no bicipital groove tenderness, no AC joint tenderness  Active Motion:       FF: 170 with significant pain       ER: 70 with significant pain       IR: T8  Strength: 4/5 empty can, 4/5 ER,  5/5 IR   Sensory - SILT in the Radial / Ulnar / Median / Axillary nerve distributions  Motor - AIN / PIN / Radial / Ulnar / Median / Axillary motor nerves in tact  Palpable Radial pulse  Cap refill <2secs in all digits          Imaging    I reviewed and interpreted X-rays of the right shoulder which show no acute fractures. Minimal degenerative changes.  Humeral head well centered on the humerus

## 2023-12-04 DIAGNOSIS — M25.511 ACUTE PAIN OF RIGHT SHOULDER: ICD-10-CM

## 2023-12-04 RX ORDER — MELOXICAM 15 MG/1
15 TABLET ORAL DAILY
Qty: 21 TABLET | Refills: 0 | Status: SHIPPED | OUTPATIENT
Start: 2023-12-04

## 2023-12-13 ENCOUNTER — HOSPITAL ENCOUNTER (OUTPATIENT)
Dept: RADIOLOGY | Facility: HOSPITAL | Age: 58
Discharge: HOME | End: 2023-12-13
Attending: ORTHOPAEDIC SURGERY
Payer: COMMERCIAL

## 2023-12-13 DIAGNOSIS — M24.811 INTERNAL DERANGEMENT OF RIGHT SHOULDER: ICD-10-CM

## 2023-12-13 PROCEDURE — 73221 MRI JOINT UPR EXTREM W/O DYE: CPT

## 2023-12-13 PROCEDURE — G1004 CDSM NDSC: HCPCS

## 2023-12-14 NOTE — H&P (VIEW-ONLY)
[facility]  OTOLARYNGOLOGY - HEAD & NECK SURGERY    Wander Lechuga  [id number]  1965    HISTORY & PHYSICAL    History of Present Illness: 58-year-old man who presents for follow-up.  He continues to have issues with breathing through his nose.  The right side seems to be worse.  Every now and then he will have to use Afrin.  He has been using mometasone sinus rinses.  No other major issues at this time.    HISTORICALLY:  58-year-old man who presents for further evaluation of chronic sinusitis.  He is a referral from Dr. Ruffin.  He has been having some issues with this for quite some time.  He is always feeling stuffed up with nasal obstruction.  The right side is worse.  Currently, he is not having any discolored nasal drainage.  He denies allergy type symptoms such as sneezing runny nose.  No itchy eyes.  He has tried things like Flonase in the past but he states that it has not really helped.  He is also been on courses of antibiotics and steroids without much relief.  He does have asthma and does take Advair which is controlling it pretty well.  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  He has some relief with Afrin, but he states that he stays away from this because he does not want to get a rebound type congestion.  CT scan did show evidence of septal deviation as well as sinusitis.    No Known Allergies    Past Medical History:   Diagnosis Date    Asthma     Contact lens/glasses fitting     and glasses    Hypertension     Tinnitus        Past Surgical History:   Procedure Laterality Date    APPENDECTOMY      age 40    TONSILLECTOMY      WISDOM TOOTH EXTRACTION         Family History   Problem Relation Age of Onset    Lung cancer Mother     Cancer Mother         lung,brain    No Known Problems Sister     No Known Problems Brother     No Known Problems Sister         Social History     Tobacco Use    Smoking status:  "Former     Current packs/day: 0.00     Average packs/day: 1 pack/day for 18.1 years (18.1 ttl pk-yrs)     Types: Cigarettes     Start date: 1998     Quit date: 2016     Years since quittin.3    Smokeless tobacco: Never    Tobacco comments:     vapes daily   Vaping Use    Vaping status: Every Day    Substances: Nicotine   Substance Use Topics    Alcohol use: Not Currently     Alcohol/week: 1.0 standard drink of alcohol     Types: 1 Standard drinks or equivalent per week     Comment: occ    Drug use: Never       Current Outpatient Medications on File Prior to Visit   Medication Sig Dispense Refill    amLODIPine (NORVASC) 2.5 mg tablet TAKE 1 TABLET BY MOUTH EVERY DAY 90 tablet 1    Fluticasone-Salmeterol (Advair Diskus) 100-50 mcg/dose inhaler INHALE 1 DOSE BY MOUTH TWICE DAILY. RINSE MOUTH AFTER USE 60 blister 3    Mometasone Furoate POWD Compound Mometasone 1 mg capsule to be added to sinus rinse twice daily 180 g 3    fluticasone (FLONASE) 50 mcg/act nasal spray 1 spray into each nostril daily (Patient not taking: Reported on 2023)      meloxicam (MOBIC) 15 mg tablet TAKE 1 TABLET (15 MG TOTAL) BY MOUTH DAILY. 21 tablet 0     No current facility-administered medications on file prior to visit.       Review of Systems:  10-point ROS performed.  Patient denies fevers or chills.  All other systems reviewed and found to be negative unless otherwise noted in the HPI or MA note.       Vitals:    23 1000   Weight: 86.6 kg (191 lb)   Height: 5' 11\" (1.803 m)         General Appearance: No apparent distress    Head: Normocephalic, atraumatic.     Face: Symmetric without obvious lesions.    Eyes: Conjunctiva clear, extraocular movements are intact.    Ears: Pinna normal shape and position.  Canals are clear.  TMs intact with no middle ear effusion.    Nose: External pyramid midline.  Slight septal deviation towards the right side, moderate turbinate hypertrophy.    Oral cavity/Oropharynx: No mucosal " lesions, masses, or pharyngeal asymmetry.     Neck: No cervical lymphadenopathy or masses appreciated    Skin: Skin warm and dry.    Neurological: Cranial nerves II to XII are intact.    Respiratory: No stridor or labored breathing.    Cardiovascular: Good perfusion of the upper extremities.  No cyanosis of the fingers or hands.     Psychiatric: Alert and oriented.    Data Reviewed: CT scan is reviewed.  This does show evidence of scattered ethmoid and primarily maxillary sinusitis with some sparing of the frontals sphenoids.  Septal deviation towards the right side.  Bilateral inferior turbinate hypertrophy.      Assessment:  1. Chronic maxillary sinusitis  amoxicillin-clavulanate (AUGMENTIN) 875-125 mg per tablet    methylprednisolone (Medrol) 4 mg tablet      2. Chronic ethmoidal sinusitis  amoxicillin-clavulanate (AUGMENTIN) 875-125 mg per tablet    methylprednisolone (Medrol) 4 mg tablet      3. Chronic sphenoidal sinusitis  amoxicillin-clavulanate (AUGMENTIN) 875-125 mg per tablet    methylprednisolone (Medrol) 4 mg tablet      4. Chronic frontal sinusitis  amoxicillin-clavulanate (AUGMENTIN) 875-125 mg per tablet    methylprednisolone (Medrol) 4 mg tablet      5. Deviated nasal septum  amoxicillin-clavulanate (AUGMENTIN) 875-125 mg per tablet    methylprednisolone (Medrol) 4 mg tablet      6. Hypertrophy of inferior nasal turbinate  amoxicillin-clavulanate (AUGMENTIN) 875-125 mg per tablet    methylprednisolone (Medrol) 4 mg tablet      7. Non-seasonal allergic rhinitis due to other allergic trigger  amoxicillin-clavulanate (AUGMENTIN) 875-125 mg per tablet    methylprednisolone (Medrol) 4 mg tablet             Plan: The patient has had issues with chronic rhinosinusitis, nasal obstruction.  I do think that based on his symptoms that he is a surgical candidate to include septoplasty, inferior turbinate reduction, bilateral maxillary antrostomy and total ethmoidectomy.  He has been on mometasone sinus rinses,  and has only noticed a marginal difference.  At this point he does wish to proceed with surgery.  All the risk and benefits were discussed with the patient.  He will follow-up at the time of surgery.    Jose Rafael Barkley MD  Otolaryngology - Head & Neck Surgery  [facility]            ** Please Note: Dictation voice to text software may have been used in the creation of this document. **

## 2023-12-14 NOTE — PRE-PROCEDURE INSTRUCTIONS
Pre-Surgery Instructions:   Medication Instructions    amLODIPine (NORVASC) 2.5 mg tablet Take day of surgery.    amoxicillin-clavulanate (AUGMENTIN) 875-125 mg per tablet Instructions provided by MD    Fluticasone-Salmeterol (Advair Diskus) 100-50 mcg/dose inhaler Take day of surgery.    methylprednisolone (Medrol) 4 mg tablet Instructions provided by MD    Mometasone Furoate POWD Hold day of surgery.    Medication instructions for day surgery reviewed. Please use only a sip of water to take your instructed medications. Avoid all over the counter vitamins, supplements and NSAIDS for one week prior to surgery per anesthesia guidelines. Tylenol is ok to take as needed.     You will receive a call one business day prior to surgery with an arrival time and hospital directions. If your surgery is scheduled on a Monday, the hospital will be calling you on the Friday prior to your surgery. If you have not heard from anyone by 8pm, please call the hospital supervisor through the hospital  at [phone]. ([place] [phone]).    Do not eat or drink anything after midnight the night before your surgery, including candy, mints, lifesavers, or chewing gum. Do not drink alcohol 24hrs before your surgery. Try not to smoke at least 24hrs before your surgery.       Follow the pre surgery showering instructions as listed in the “My Surgical Experience Booklet” or otherwise provided by your surgeon's office. Do not use a blade to shave the surgical area 1 week before surgery. It is okay to use a clean electric clippers up to 24 hours before surgery. Do not apply any lotions, creams, including makeup, cologne, deodorant, or perfumes after showering on the day of your surgery. Do not use dry shampoo, hair spray, hair gel, or any type of hair products.     No contact lenses, eye make-up, or artificial eyelashes. Remove nail polish, including gel polish, and any artificial, gel, or acrylic nails if possible. Remove all  jewelry including rings and body piercing jewelry.     Wear causal clothing that is easy to take on and off. Consider your type of surgery.    Keep any valuables, jewelry, piercings at home. Please bring any specially ordered equipment (sling, braces) if indicated.    Arrange for a responsible person to drive you to and from the hospital on the day of your surgery. Visitor Guidelines discussed.     Call the surgeon's office with any new illnesses, exposures, or additional questions prior to surgery.    Please reference your “My Surgical Experience Booklet” for additional information to prepare for your upcoming surgery.

## 2023-12-19 ENCOUNTER — ANESTHESIA EVENT (OUTPATIENT)
Dept: PERIOP | Facility: HOSPITAL | Age: 58
End: 2023-12-19
Payer: COMMERCIAL

## 2023-12-20 ENCOUNTER — HOSPITAL ENCOUNTER (OUTPATIENT)
Facility: HOSPITAL | Age: 58
Setting detail: OUTPATIENT SURGERY
Discharge: HOME | End: 2023-12-20
Attending: OTOLARYNGOLOGY | Admitting: OTOLARYNGOLOGY
Payer: COMMERCIAL

## 2023-12-20 ENCOUNTER — ANESTHESIA (OUTPATIENT)
Dept: PERIOP | Facility: HOSPITAL | Age: 58
End: 2023-12-20
Payer: COMMERCIAL

## 2023-12-20 VITALS
HEIGHT: 71 IN | TEMPERATURE: 97.5 F | BODY MASS INDEX: 26.3 KG/M2 | DIASTOLIC BLOOD PRESSURE: 86 MMHG | RESPIRATION RATE: 14 BRPM | SYSTOLIC BLOOD PRESSURE: 136 MMHG | OXYGEN SATURATION: 92 % | WEIGHT: 187.83 LBS | HEART RATE: 80 BPM

## 2023-12-20 DIAGNOSIS — B36.9 FUNGAL DERMATITIS: Primary | ICD-10-CM

## 2023-12-20 DIAGNOSIS — F17.290 VAPING NICOTINE DEPENDENCE, TOBACCO PRODUCT: ICD-10-CM

## 2023-12-20 DIAGNOSIS — Z12.11 ENCOUNTER FOR SCREENING FOR MALIGNANT NEOPLASM OF COLON: ICD-10-CM

## 2023-12-20 DIAGNOSIS — J32.0 CHRONIC MAXILLARY SINUSITIS: ICD-10-CM

## 2023-12-20 PROCEDURE — C2625 STENT, NON-COR, TEM W/DEL SY: HCPCS | Performed by: OTOLARYNGOLOGY

## 2023-12-20 PROCEDURE — 61782 SCAN PROC CRANIAL EXTRA: CPT | Performed by: OTOLARYNGOLOGY

## 2023-12-20 PROCEDURE — 31255 NSL/SINS NDSC W/TOT ETHMDCT: CPT | Performed by: OTOLARYNGOLOGY

## 2023-12-20 PROCEDURE — 31256 EXPLORATION MAXILLARY SINUS: CPT | Performed by: OTOLARYNGOLOGY

## 2023-12-20 PROCEDURE — 30802 ABLATE INF TURBINATE SUBMUC: CPT | Performed by: OTOLARYNGOLOGY

## 2023-12-20 PROCEDURE — 88305 TISSUE EXAM BY PATHOLOGIST: CPT | Performed by: STUDENT IN AN ORGANIZED HEALTH CARE EDUCATION/TRAINING PROGRAM

## 2023-12-20 PROCEDURE — 88311 DECALCIFY TISSUE: CPT | Performed by: STUDENT IN AN ORGANIZED HEALTH CARE EDUCATION/TRAINING PROGRAM

## 2023-12-20 PROCEDURE — 30520 REPAIR OF NASAL SEPTUM: CPT | Performed by: OTOLARYNGOLOGY

## 2023-12-20 DEVICE — PROPEL MINI SINUS IMPLANT
Type: IMPLANTABLE DEVICE | Site: NOSE | Status: FUNCTIONAL
Brand: PROPEL MINI

## 2023-12-20 RX ORDER — ACETAMINOPHEN 325 MG/1
650 TABLET ORAL EVERY 6 HOURS PRN
Status: DISCONTINUED | OUTPATIENT
Start: 2023-12-20 | End: 2023-12-20 | Stop reason: HOSPADM

## 2023-12-20 RX ORDER — HYDROCODONE BITARTRATE AND ACETAMINOPHEN 5; 325 MG/1; MG/1
1 TABLET ORAL EVERY 6 HOURS PRN
Qty: 15 TABLET | Refills: 0 | Status: SHIPPED | OUTPATIENT
Start: 2023-12-20 | End: 2023-12-30

## 2023-12-20 RX ORDER — ROCURONIUM BROMIDE 10 MG/ML
INJECTION, SOLUTION INTRAVENOUS AS NEEDED
Status: DISCONTINUED | OUTPATIENT
Start: 2023-12-20 | End: 2023-12-20

## 2023-12-20 RX ORDER — PROPOFOL 10 MG/ML
INJECTION, EMULSION INTRAVENOUS AS NEEDED
Status: DISCONTINUED | OUTPATIENT
Start: 2023-12-20 | End: 2023-12-20

## 2023-12-20 RX ORDER — ONDANSETRON 2 MG/ML
4 INJECTION INTRAMUSCULAR; INTRAVENOUS ONCE AS NEEDED
Status: DISCONTINUED | OUTPATIENT
Start: 2023-12-20 | End: 2023-12-20 | Stop reason: HOSPADM

## 2023-12-20 RX ORDER — MAGNESIUM HYDROXIDE 1200 MG/15ML
LIQUID ORAL AS NEEDED
Status: DISCONTINUED | OUTPATIENT
Start: 2023-12-20 | End: 2023-12-20 | Stop reason: HOSPADM

## 2023-12-20 RX ORDER — FENTANYL CITRATE/PF 50 MCG/ML
50 SYRINGE (ML) INJECTION
Status: DISCONTINUED | OUTPATIENT
Start: 2023-12-20 | End: 2023-12-20 | Stop reason: HOSPADM

## 2023-12-20 RX ORDER — ACETAMINOPHEN 325 MG/1
975 TABLET ORAL ONCE
Status: COMPLETED | OUTPATIENT
Start: 2023-12-20 | End: 2023-12-20

## 2023-12-20 RX ORDER — MIDAZOLAM HYDROCHLORIDE 2 MG/2ML
INJECTION, SOLUTION INTRAMUSCULAR; INTRAVENOUS AS NEEDED
Status: DISCONTINUED | OUTPATIENT
Start: 2023-12-20 | End: 2023-12-20

## 2023-12-20 RX ORDER — DEXAMETHASONE SODIUM PHOSPHATE 10 MG/ML
INJECTION, SOLUTION INTRAMUSCULAR; INTRAVENOUS AS NEEDED
Status: DISCONTINUED | OUTPATIENT
Start: 2023-12-20 | End: 2023-12-20

## 2023-12-20 RX ORDER — GINSENG 100 MG
CAPSULE ORAL AS NEEDED
Status: DISCONTINUED | OUTPATIENT
Start: 2023-12-20 | End: 2023-12-20 | Stop reason: HOSPADM

## 2023-12-20 RX ORDER — SODIUM CHLORIDE, SODIUM LACTATE, POTASSIUM CHLORIDE, CALCIUM CHLORIDE 600; 310; 30; 20 MG/100ML; MG/100ML; MG/100ML; MG/100ML
125 INJECTION, SOLUTION INTRAVENOUS CONTINUOUS
Status: DISCONTINUED | OUTPATIENT
Start: 2023-12-20 | End: 2023-12-20 | Stop reason: HOSPADM

## 2023-12-20 RX ORDER — FENTANYL CITRATE 50 UG/ML
INJECTION, SOLUTION INTRAMUSCULAR; INTRAVENOUS AS NEEDED
Status: DISCONTINUED | OUTPATIENT
Start: 2023-12-20 | End: 2023-12-20

## 2023-12-20 RX ORDER — LIDOCAINE HYDROCHLORIDE AND EPINEPHRINE 10; 10 MG/ML; UG/ML
INJECTION, SOLUTION INFILTRATION; PERINEURAL AS NEEDED
Status: DISCONTINUED | OUTPATIENT
Start: 2023-12-20 | End: 2023-12-20 | Stop reason: HOSPADM

## 2023-12-20 RX ORDER — OXYMETAZOLINE HYDROCHLORIDE 0.05 G/100ML
SPRAY NASAL AS NEEDED
Status: DISCONTINUED | OUTPATIENT
Start: 2023-12-20 | End: 2023-12-20 | Stop reason: HOSPADM

## 2023-12-20 RX ORDER — PHENYLEPHRINE HCL IN 0.9% NACL 1 MG/10 ML
SYRINGE (ML) INTRAVENOUS AS NEEDED
Status: DISCONTINUED | OUTPATIENT
Start: 2023-12-20 | End: 2023-12-20

## 2023-12-20 RX ORDER — HYDROCODONE BITARTRATE AND ACETAMINOPHEN 5; 325 MG/1; MG/1
1 TABLET ORAL EVERY 6 HOURS PRN
Status: DISCONTINUED | OUTPATIENT
Start: 2023-12-20 | End: 2023-12-20 | Stop reason: HOSPADM

## 2023-12-20 RX ORDER — ONDANSETRON 2 MG/ML
INJECTION INTRAMUSCULAR; INTRAVENOUS AS NEEDED
Status: DISCONTINUED | OUTPATIENT
Start: 2023-12-20 | End: 2023-12-20

## 2023-12-20 RX ORDER — LIDOCAINE HYDROCHLORIDE 20 MG/ML
INJECTION, SOLUTION EPIDURAL; INFILTRATION; INTRACAUDAL; PERINEURAL AS NEEDED
Status: DISCONTINUED | OUTPATIENT
Start: 2023-12-20 | End: 2023-12-20

## 2023-12-20 RX ORDER — SODIUM CHLORIDE, SODIUM LACTATE, POTASSIUM CHLORIDE, CALCIUM CHLORIDE 600; 310; 30; 20 MG/100ML; MG/100ML; MG/100ML; MG/100ML
INJECTION, SOLUTION INTRAVENOUS CONTINUOUS PRN
Status: DISCONTINUED | OUTPATIENT
Start: 2023-12-20 | End: 2023-12-20

## 2023-12-20 RX ADMIN — PROPOFOL 50 MG: 10 INJECTION, EMULSION INTRAVENOUS at 13:55

## 2023-12-20 RX ADMIN — SODIUM CHLORIDE, SODIUM LACTATE, POTASSIUM CHLORIDE, AND CALCIUM CHLORIDE: .6; .31; .03; .02 INJECTION, SOLUTION INTRAVENOUS at 13:46

## 2023-12-20 RX ADMIN — DEXAMETHASONE SODIUM PHOSPHATE 10 MG: 10 INJECTION, SOLUTION INTRAMUSCULAR; INTRAVENOUS at 13:48

## 2023-12-20 RX ADMIN — LIDOCAINE HYDROCHLORIDE 100 MG: 20 INJECTION, SOLUTION EPIDURAL; INFILTRATION; INTRACAUDAL at 13:48

## 2023-12-20 RX ADMIN — ONDANSETRON 4 MG: 2 INJECTION INTRAMUSCULAR; INTRAVENOUS at 14:01

## 2023-12-20 RX ADMIN — MIDAZOLAM 2 MG: 1 INJECTION INTRAMUSCULAR; INTRAVENOUS at 13:46

## 2023-12-20 RX ADMIN — ACETAMINOPHEN 975 MG: 325 TABLET, FILM COATED ORAL at 12:38

## 2023-12-20 RX ADMIN — PROPOFOL 150 MG: 10 INJECTION, EMULSION INTRAVENOUS at 13:48

## 2023-12-20 RX ADMIN — Medication 200 MCG: at 14:22

## 2023-12-20 RX ADMIN — HYDROCODONE BITARTRATE AND ACETAMINOPHEN 1 TABLET: 5; 325 TABLET ORAL at 18:15

## 2023-12-20 RX ADMIN — FENTANYL CITRATE 50 MCG: 50 INJECTION INTRAMUSCULAR; INTRAVENOUS at 13:58

## 2023-12-20 RX ADMIN — ROCURONIUM BROMIDE 50 MG: 10 INJECTION, SOLUTION INTRAVENOUS at 13:48

## 2023-12-20 RX ADMIN — FENTANYL CITRATE 50 MCG: 50 INJECTION INTRAMUSCULAR; INTRAVENOUS at 16:21

## 2023-12-20 RX ADMIN — ACETAMINOPHEN 650 MG: 325 TABLET, FILM COATED ORAL at 18:19

## 2023-12-20 RX ADMIN — FENTANYL CITRATE 50 MCG: 50 INJECTION INTRAMUSCULAR; INTRAVENOUS at 13:48

## 2023-12-20 RX ADMIN — SUGAMMADEX 200 MG: 100 INJECTION, SOLUTION INTRAVENOUS at 16:31

## 2023-12-20 NOTE — ANESTHESIA PREPROCEDURE EVALUATION
Procedure:  FUNCTIONAL ENDOSCOPIC SINUS SURGERY (FESS) IMAGED GUIDED BILATERAL MAXILLARY ANTROSTOMY TOTAL ETHMOIDECTOMY (Nose)  SEPTOPLASTY,TURBINOPLASTY (Nose)    Relevant Problems   PULMONARY   (+) Asthma        Physical Exam    Airway    Mallampati score: II  TM Distance: >3 FB  Neck ROM: full     Dental       Cardiovascular  Cardiovascular exam normal    Pulmonary  Pulmonary exam normal     Other Findings        Anesthesia Plan  ASA Score- 2     Anesthesia Type- general with ASA Monitors.         Additional Monitors:     Airway Plan: ETT.           Plan Factors-    Chart reviewed.   Existing labs reviewed. Patient summary reviewed.    Patient is a current smoker.      Obstructive sleep apnea risk education given perioperatively.        Induction- intravenous.    Postoperative Plan- Plan for postoperative opioid use. Planned trial extubation    Informed Consent- Anesthetic plan and risks discussed with patient.  I personally reviewed this patient with the CRNA. Discussed and agreed on the Anesthesia Plan with the CRNA..            History Comments History Comments   Asthma  Tinnitus    Contact lens/glasses fitting and glasses Hypertension      Surgical History   Current as of 12/20/23 1301  APPENDECTOMY WISDOM TOOTH EXTRACTION   TONSILLECTOMY      Substance History   Current as of 12/20/23 1301  Smoking Status: Every Day - 6.4 pack years   Smokeless Tobacco Status: Never   Alcohol use: Not Currently   Drug use: Never     Problem List   Current as of 12/20/23 1301  Asthma   Encounter for screening for malignant neoplasm of colon   Fungal dermatitis   SNHL (sensorineural hearing loss)   Tinnitus   Vaping nicotine dependence, tobacco product

## 2023-12-20 NOTE — INTERVAL H&P NOTE
H&P reviewed. After examining the patient I find no changes in the patients condition since the H&P had been written.    Vitals:    12/20/23 1232   BP: 145/90   Pulse: 71   Resp: 18   Temp: (!) 97.4 °F (36.3 °C)   SpO2: 98%

## 2023-12-20 NOTE — DISCHARGE INSTR - AVS FIRST PAGE
POST-OPERATIVE CARE INSTRUCTION SHEET      ABOUT POST-OPERATIVE CARE VISITS    Post-operative visits are an indispensable part of the surgery, since they help promote healing and prevent persistent or recurrent disease. You should plan on remaining within the [facility] area for at least one day following surgery. You will usually be seen within 7-10 days post-operatively for debridement (removal of crusts from your nose).    You should anticipate 2 office visits over the first 4 weeks after surgery. Continued debridement will be done at these visits, and persistent inflammation or scar tissue will be removed under local anesthesia. Although chances of complications from these manipulations are rare, the potential risks are the same as the surgery itself.     WHAT YOU CAN EXPECT TO EXPERIENCE    You can expect some bleeding from your nose for several days after the surgery and again after each office debridement. When bleeding occurs down the front of your nose or into the back of your throat, you should tilt your head back while sitting up and breathe gently through your nose. If bleeding persists for an extended period of time notify our office.  Over-the-counter Afrin nose spray (oxymetazoline) can be used to open your nose  and reduce bleeding during the first two nights after surgery if needed.    As the sinuses begin to clear themselves, you can expect to have some thick brown drainage from your nose. This is mucus and old blood and does not indicate an infection.    You may experience some discomfort post-operatively due to manipulation and inflammation. Take your pain medication as directed. Often extra-strength Tylenol is sufficient. You may wish to take medication for pain prior to your post-operative visits (particularly early on, when the nose is most sensitive). If the medication is sedating, be sure to have someone available to drive you.      SOME IMPORTANT POST-OPERATIVE DO'S AND DO NOT'S    DO  NOT blow your nose until you have been given permission to do so.    DO NOT bend, lift or strain for at least one week after surgery. These activities will promote bleeding from your nose. You should not plan on participating in rigorous activity until healing is completed.    DO NOT suppress the need to cough or sneeze, but cough/sneeze with your mouth open.    DO NOT resume use of any aspirin-containing products or other blood thinners until after discussing this with us. Typically, you can restart after 7-10 days.    DO use nasal saline spray (without decongestant) every hour while you are awake beginning the day after surgery.  This helps moisten your nose and prevents large crusts from forming.  The preferred brand is Simply Saline due to lack of a preservative which is irritating to some people.    DO use nasal saline irrigation 4-6 times daily beginning on post-operative day three if there is no nasal packing.    DO continue your steroids (Medrol or Prednisone) and antibiotics (if they have been prescribed for you). Diarrhea from antibiotic usage can lead to a serious health problem. This can often be prevented by taking probiotics daily, which is found in yogurt with active cultures or as tablets in a health food store. If you should experience diarrhea, stop the antibiotic and notify us. Further evaluation may be required.    DO notify us for any of the following: temperature elevations above 100.5 F, clear watery drainage from your nose, changes in vision, swelling of the eyes, worsening headache or neck stiffness.                                Contact our office for an emergency or go to the emergency room nearest to you.

## 2023-12-20 NOTE — ANESTHESIA POSTPROCEDURE EVALUATION
Post-Op Assessment Note    CV Status:  Stable  Pain Score: 2    Pain management: adequate    Multimodal analgesia used between 6 hours prior to anesthesia start to PACU discharge    Mental Status:  Alert and awake   Hydration Status:  Euvolemic   PONV Controlled:  Controlled   Airway Patency:  Patent  Two or more mitigation strategies used for obstructive sleep apnea   Post Op Vitals Reviewed: Yes      Staff: CRNA               BP   138/76   Temp 97.8   Pulse 99   Resp 18   SpO2 91

## 2023-12-27 PROCEDURE — 88305 TISSUE EXAM BY PATHOLOGIST: CPT | Performed by: STUDENT IN AN ORGANIZED HEALTH CARE EDUCATION/TRAINING PROGRAM

## 2023-12-27 PROCEDURE — 88311 DECALCIFY TISSUE: CPT | Performed by: STUDENT IN AN ORGANIZED HEALTH CARE EDUCATION/TRAINING PROGRAM

## 2024-01-02 ENCOUNTER — OFFICE VISIT (OUTPATIENT)
Dept: OBGYN CLINIC | Facility: CLINIC | Age: 59
End: 2024-01-02
Payer: COMMERCIAL

## 2024-01-02 VITALS
BODY MASS INDEX: 26.18 KG/M2 | DIASTOLIC BLOOD PRESSURE: 89 MMHG | HEIGHT: 71 IN | WEIGHT: 187 LBS | SYSTOLIC BLOOD PRESSURE: 154 MMHG | HEART RATE: 77 BPM

## 2024-01-02 DIAGNOSIS — S46.011D TRAUMATIC COMPLETE TEAR OF RIGHT ROTATOR CUFF, SUBSEQUENT ENCOUNTER: Primary | ICD-10-CM

## 2024-01-02 PROCEDURE — 99214 OFFICE O/P EST MOD 30 MIN: CPT | Performed by: ORTHOPAEDIC SURGERY

## 2024-01-02 RX ORDER — CHLORHEXIDINE GLUCONATE ORAL RINSE 1.2 MG/ML
15 SOLUTION DENTAL ONCE
OUTPATIENT
Start: 2024-01-02 | End: 2024-01-02

## 2024-01-02 NOTE — PROGRESS NOTES
Orthopedic Sports Medicine New Patient Visit     Assesment:   58 y.o. male with traumatic right rotator cuff tear    Plan:    Upon examination and review of MRI findings, we did discuss that the patient has a rotator cuff tear, resulting in shoulder pain and objective weakness. We did discuss non-operative versus operative treatment. Specifically, non-operative treatment would include activity modifications, formal PT, over-the-counter medications, and steroid injections. Because the patient  is an overall young and healthy patient with an acute traumatic rotator cuff tear noted on MRI in the setting of a traumatic injury, I recommend surgical intervention with a rotator cuff repair. We had a detailed discussion of the risks, benefits, and alternatives to this procedure. The risks include but are not limited to infection, bleeding, stiffness, loss of range of motion, blood clot, failure of surgery, fracture, risk of potential future arthritis, swelling, injury to surrounding structures/nerve/artery/vein, failure of medical implants or surgical instruments, retained hardware and/or foreign body, and continued pain/dysfunction/disability. We discussed the expected timeline for recovery including the timeline for return to work and sporting activities. The patient expressed good understanding and elected to proceed. They will meet be scheduled at a mutually convenient time in the near future.     We discussed that smoking does place him at an increased risk of failure of repair.  He understood this well.  We did discuss that if he is able to quit now and continue smoking sensation through his whole recovery process there is possibly increased risk of success.     A post-op sling was provided today for them to bring to the hospital on the day of surgery. We discussed that the patient will spend 6 weeks in a sling at all times, including sleep, except for hygiene. I recommend that they do not drive while in the sling.  They will start PT per protocol, which will be provided on the day of surgery. In the meantime, the patient can use ice/heat and over-the-counter medications as needed for pain.    We will plan to start physical therapy 5-7 days following surgery per protocol provided on day of surgery.     Follow up 10-14 days after surgery.          History of Present Illness:    The patient is a 58 y.o. male who presents with pain in the right shoulder.  His shoulder was normal with no pain and no issues with function prior to acute injury.  Roughly 1 week ago his great Rosendo pulled his leash firmly causing a pop and acute pain in the shoulder.  Several days later the patient was working with some lawn equipment when it was dropped and he had to catch it.  This produced another pop in the shoulder further worsening of his pain.  At this point he struggles with any overhead or reaching activity.  Pain is localized to the lateral aspect of the shoulder with radiation to the mid humerus.  He denies any numbness or tingling as well as any neck pain.    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    Shoulder Surgical History:  Non  Past Medical, Social and Family History:  Past Medical History:   Diagnosis Date    Asthma     Contact lens/glasses fitting     and glasses    Hypertension     Tinnitus      Past Surgical History:   Procedure Laterality Date    APPENDECTOMY      age 40    NASAL SEPTOPLASTY W/ TURBINOPLASTY N/A 12/20/2023    Procedure: SEPTOPLASTY,TURBINOPLASTY;  Surgeon: Jose Rafael Barkley MD;  Location: [facility];  Service: ENT    [place] NSL/SINUS NDSC MAX ANTROST W/RMVL TISS MAX SINUS N/A 12/20/2023    Procedure: FUNCTIONAL ENDOSCOPIC SINUS SURGERY (FESS) IMAGED GUIDED BILATERAL MAXILLARY ANTROSTOMY TOTAL ETHMOIDECTOMY;  Surgeon: Jose Rafael Barkley MD;  Location: [facility];  Service: ENT    TONSILLECTOMY      WISDOM TOOTH EXTRACTION       No Known  Allergies  Current Outpatient Medications on File Prior to Visit   Medication Sig Dispense Refill    amLODIPine (NORVASC) 2.5 mg tablet TAKE 1 TABLET BY MOUTH EVERY DAY 90 tablet 1    Fluticasone-Salmeterol (Advair Diskus) 100-50 mcg/dose inhaler INHALE 1 DOSE BY MOUTH TWICE DAILY. RINSE MOUTH AFTER USE 60 blister 3    Mometasone Furoate POWD Compound Mometasone 1 mg capsule to be added to sinus rinse twice daily 180 g 3     No current facility-administered medications on file prior to visit.     Social History     Socioeconomic History    Marital status: /Civil Union     Spouse name: Not on file    Number of children: Not on file    Years of education: Not on file    Highest education level: Not on file   Occupational History    Not on file   Tobacco Use    Smoking status: Every Day     Current packs/day: 0.25     Average packs/day: 0.3 packs/day for 25.5 years (6.4 ttl pk-yrs)     Types: Cigarettes     Start date: 6/23/1998    Smokeless tobacco: Never    Tobacco comments:     vapes daily   Vaping Use    Vaping status: Every Day    Substances: Nicotine   Substance and Sexual Activity    Alcohol use: Not Currently     Alcohol/week: 1.0 standard drink of alcohol     Types: 1 Standard drinks or equivalent per week     Comment: occ    Drug use: Never    Sexual activity: Yes     Partners: Female     Birth control/protection: None   Other Topics Concern    Not on file   Social History Narrative    Not on file     Social Determinants of Health     Financial Resource Strain: Not on file   Food Insecurity: Not on file   Transportation Needs: Not on file   Physical Activity: Not on file   Stress: Not on file   Social Connections: Not on file   Intimate Partner Violence: Not on file   Housing Stability: Not on file         I have reviewed the past medical, surgical, social and family history, medications and allergies as documented in the EMR.    Review of systems: ROS is negative other than that noted in the  "HPI.  Constitutional: Negative for fatigue and fever.   HENT: Negative for sore throat.    Respiratory: Negative for shortness of breath.    Cardiovascular: Negative for chest pain.   Gastrointestinal: Negative for abdominal pain.   Endocrine: Negative for cold intolerance and heat intolerance.   Genitourinary: Negative for flank pain.   Musculoskeletal: Negative for back pain.   Skin: Negative for rash.   Allergic/Immunologic: Negative for immunocompromised state.   Neurological: Negative for dizziness.   Psychiatric/Behavioral: Negative for agitation.      Physical Exam:    Blood pressure 154/89, pulse 77, height 5' 11\" (1.803 m), weight 84.8 kg (187 lb).    General/Constitutional: NAD, well developed, well nourished  HENT: Normocephalic, atraumatic  CV: Intact distal pulses, regular rate  Resp: No respiratory distress or labored breathing  Abdomen: soft, nondistended, non tender   Lymphatic: No lymphadenopathy palpated  Neuro: Alert and Oriented x 3, no focal deficits  Psych: Normal mood, normal affect  Skin: Warm, dry, no rashes, no erythema      Shoulder Exam:      Inspection: No ecchymosis, edema, or deformity. No visualized wounds or skin lesions   Palpation: Moderate tenderness over the greater tuberosity, no bicipital groove tenderness, no AC joint tenderness  Active Motion:       FF: 170 with significant pain beyond 90 degrees       ER: 80 bilaterally without significant pain       IR: T8 bilaterally  Strength: 4/5 empty can, 4/5 ER,  5/5 IR   Sensory - SILT in the Radial / Ulnar / Median / Axillary nerve distributions  Motor - AIN / PIN / Radial / Ulnar / Median / Axillary motor nerves in tact  Palpable Radial pulse  Cap refill <2secs in all digits    - Lift Off            Imaging    I reviewed and interpreted X-rays of the right shoulder which show no acute fractures. Minimal degenerative changes. Humeral head well centered on the humerus       "

## 2024-01-03 ENCOUNTER — APPOINTMENT (OUTPATIENT)
Dept: LAB | Facility: HOSPITAL | Age: 59
End: 2024-01-03
Payer: COMMERCIAL

## 2024-01-03 DIAGNOSIS — S46.011D TRAUMATIC COMPLETE TEAR OF RIGHT ROTATOR CUFF, SUBSEQUENT ENCOUNTER: ICD-10-CM

## 2024-01-03 LAB
ANION GAP SERPL CALCULATED.3IONS-SCNC: 7 MMOL/L
BUN SERPL-MCNC: 17 MG/DL (ref 5–25)
CALCIUM SERPL-MCNC: 8.7 MG/DL (ref 8.4–10.2)
CHLORIDE SERPL-SCNC: 104 MMOL/L (ref 96–108)
CO2 SERPL-SCNC: 28 MMOL/L (ref 21–32)
CREAT SERPL-MCNC: 1.03 MG/DL (ref 0.6–1.3)
ERYTHROCYTE [DISTWIDTH] IN BLOOD BY AUTOMATED COUNT: 13.8 % (ref 11.6–15.1)
GFR SERPL CREATININE-BSD FRML MDRD: 78 ML/MIN/1.73SQ M
GLUCOSE P FAST SERPL-MCNC: 90 MG/DL (ref 65–99)
HCT VFR BLD AUTO: 49.6 % (ref 36.5–49.3)
HGB BLD-MCNC: 16.2 G/DL (ref 12–17)
MCH RBC QN AUTO: 31.1 PG (ref 26.8–34.3)
MCHC RBC AUTO-ENTMCNC: 32.7 G/DL (ref 31.4–37.4)
MCV RBC AUTO: 95 FL (ref 82–98)
PLATELET # BLD AUTO: 291 THOUSANDS/UL (ref 149–390)
PMV BLD AUTO: 9.4 FL (ref 8.9–12.7)
POTASSIUM SERPL-SCNC: 3.8 MMOL/L (ref 3.5–5.3)
RBC # BLD AUTO: 5.21 MILLION/UL (ref 3.88–5.62)
SODIUM SERPL-SCNC: 139 MMOL/L (ref 135–147)
WBC # BLD AUTO: 13.65 THOUSAND/UL (ref 4.31–10.16)

## 2024-01-03 PROCEDURE — 85027 COMPLETE CBC AUTOMATED: CPT

## 2024-01-03 PROCEDURE — 80048 BASIC METABOLIC PNL TOTAL CA: CPT

## 2024-01-03 PROCEDURE — 36415 COLL VENOUS BLD VENIPUNCTURE: CPT

## 2024-01-08 NOTE — PRE-PROCEDURE INSTRUCTIONS
Pre-Surgery Instructions:   Medication Instructions    amLODIPine (NORVASC) 2.5 mg tablet Take day of surgery.    Fluticasone-Salmeterol (Advair Diskus) 100-50 mcg/dose inhaler Take day of surgery.      Medication instructions for day surgery reviewed. Please use only a sip of water to take your instructed medications. Avoid all over the counter vitamins, supplements and NSAIDS for one week prior to surgery per anesthesia guidelines. Tylenol is ok to take as needed.     You will receive a call one business day prior to surgery with an arrival time and hospital directions. If your surgery is scheduled on a Monday, the hospital will be calling you on the Friday prior to your surgery. If you have not heard from anyone by 8pm, please call the hospital supervisor through the hospital  at [phone]. ([place] [phone]).    Do not eat or drink anything after midnight the night before your surgery, including candy, mints, lifesavers, or chewing gum. Do not drink alcohol 24hrs before your surgery. Try not to smoke at least 24hrs before your surgery.       Follow the pre surgery showering instructions as listed in the “My Surgical Experience Booklet” or otherwise provided by your surgeon's office. Do not use a blade to shave the surgical area 1 week before surgery. It is okay to use a clean electric clippers up to 24 hours before surgery. Do not apply any lotions, creams, including makeup, cologne, deodorant, or perfumes after showering on the day of your surgery. Do not use dry shampoo, hair spray, hair gel, or any type of hair products.     No contact lenses, eye make-up, or artificial eyelashes. Remove nail polish, including gel polish, and any artificial, gel, or acrylic nails if possible. Remove all jewelry including rings and body piercing jewelry.     Wear causal clothing that is easy to take on and off. Consider your type of surgery.    Keep any valuables, jewelry, piercings at home. Please bring any  specially ordered equipment (sling, braces) if indicated.    Arrange for a responsible person to drive you to and from the hospital on the day of your surgery. Visitor Guidelines discussed.     Call the surgeon's office with any new illnesses, exposures, or additional questions prior to surgery.    Please reference your “My Surgical Experience Booklet” for additional information to prepare for your upcoming surgery.

## 2024-01-08 NOTE — OP NOTE
OPERATIVE REPORT  PATIENT NAME: Wander Lechuga    :  1965  MRN: [id number]  Pt Location: [facility] OR ROOM 02    SURGERY DATE: 2023    Surgeons and Role:     * Jose Rafael Barkley MD - Primary     * Yamel Stoll MD - Assisting    Preop Diagnosis:  Chronic maxillary sinusitis [J32.0]  Chronic ethmoid sinusitis  Deviated nasal septum  Inferior turbinate hypertrophy    Post-Op Diagnosis Codes:  Chronic maxillary sinusitis [J32.0]  Chronic ethmoid sinusitis  Deviated nasal septum  Inferior turbinate hypertrophy    Procedure(s):  Functional endoscopic sinus surgery including:  Bilateral maxillary antrostomy  Bilateral total ethmoidectomy  Septoplasty  Bilateral submucous resection of the inferior turbinates with therapeutic outfracture  Use of intraoperative navigation      Specimen(s):  ID Type Source Tests Collected by Time Destination   1 : Sinus and contents Tissue Maxillary Sinus TISSUE EXAM Jose Rafael Barkley MD 2023  2:26 PM        Estimated Blood Loss:   125 mL    Drains:  * No LDAs found *    Anesthesia Type:   General    Operative Indications:  Chronic maxillary sinusitis [J32.0]  Chronic ethmoid sinusitis  Deviated nasal septum  Inferior turbinate hypertrophy    Operative Findings:  Slight septal deflection to the right side, bilateral inferior turbinate hypertrophy, inflammatory mucosal changes throughout with osteitic bone    Complications:   None    Procedure and Technique:  After the patient was allowed to ask any remaining questions in the preoperative area, the patient escorted to the operating suite by both ENT and anesthesia.  There, surgical timeout was performed to ensure the proper patient and procedure.  Once this was done, general endotracheal anesthesia was induced without incident.  Once given the go-ahead by anesthesia the head of the bed was rotated 90 degrees.  The [employer] navigation device was applied to the patient's forehead and registered without incident.   Procedure(s):  LEFT SHOULDER - ARTHROSCOPY /SUBACROMIAL DECOMPRESSION/ ROTATOR CUFF REPAIR / DISTAL CLAVICLE RESECTION. general    Anesthesia Post Evaluation        Patient location during evaluation: PACU  Patient participation: complete - patient participated  Level of consciousness: awake  Pain management: satisfactory to patient  Airway patency: patent  Anesthetic complications: no  Cardiovascular status: hemodynamically stable  Respiratory status: spontaneous ventilation  Hydration status: euvolemic  Post anesthesia nausea and vomiting:  none      Vitals Value Taken Time   /78 9/17/2019  2:10 PM   Temp 36.7 °C (98.1 °F) 9/17/2019  2:05 PM   Pulse 75 9/17/2019  2:09 PM   Resp 16 9/17/2019  2:10 PM   SpO2 97 % 9/17/2019  2:09 PM   Vitals shown include unvalidated device data. Afrin-soaked pledgets were placed bilaterally.  After time was given, the pledgets were removed and 1% lidocaine with epinephrine 1-100,000 was injected into the patient's septum and inferior turbinates.  Afrin-soaked pledgets were replaced.  The patient was then prepped and draped in normal fashion for the above procedures.    A left-sided hemitransfixion incision was made with a 15 blade.  Caudal elevator was then used to raise a submucoperichondrial and submucoperiosteal flap.  Raising of the flap was difficult anteriorly because of some concavity on the left side as well as some scarring.  For this reason, separate [place]'s incision was made more posteriorly.  Flap was then raised in a similar fashion with a caudal elevator.  Cartilage knife was then used to make a cut just anterior to the started the deviation similar flap was raised on the right side.  Endoscopic scissors was then used to make a cut above and below the area to the deviation.  Ash-Valentino forceps were also used to take some of the bone more superiorly that was deflected as well.  Attention was then turned to the sinus portion of the procedure.  The middle turbinate was gently medialized with a Oxford.  Uncinate process was identified and back fractured.  This was then taken down in its entirety using the [employer] microdebrider.  The natural ostium was identified and enlarged with hand instruments as well as microdebrider.  I made sure to incorporate the natural ostium of the maxillary sinus both with endoscopic visualization as well as using the image guidance navigation.  Total ethmoidectomy was then performed.  Dissection proceeded posteriorly through the ethmoid bulla as well as the basal lamella of the middle turbinate.  Total ethmoidectomy was then performed in a posterior to anterior fashion.  He did have a lot of osteitic bone which led to some more bleeding as well as increased difficulty with removing the partitions.   Afrin-soaked wedges were used as needed to control bleeding.  Attention was then turned to the right side where a similar procedure was performed including a right maxillary antrostomy as well as total ethmoidectomy.    Attention was then turned to the turbinate portion of the procedure.  The 15 blade was used to make an anterior stab incision into the head of the inferior turbinates.  The [employer] microdebrider with a turbinate blade was then inserted into the submucosal tunnel and activated until adequate reduction was achieved.  The turbinates were then outfractured with a Poston, Termo elevator, and long nasal speculum.  All bleeding was controlled with Afrin-soaked pledgets.  After time was given, the pledgets were removed.  Propel mini stents were placed into the middle meatus as well as PosiSepX.  An OG tube was used to suction out the patient's stomach contents.  The patient was then turned over to anesthesia and allowed to awaken without incident.    Patient Disposition:  PACU         SIGNATURE: Jose Rafael Barkley MD  DATE: January 8, 2024  TIME: 8:30 AM

## 2024-01-11 ENCOUNTER — APPOINTMENT (OUTPATIENT)
Dept: LAB | Facility: CLINIC | Age: 59
End: 2024-01-11
Payer: COMMERCIAL

## 2024-01-11 ENCOUNTER — CONSULT (OUTPATIENT)
Dept: FAMILY MEDICINE CLINIC | Facility: CLINIC | Age: 59
End: 2024-01-11
Payer: COMMERCIAL

## 2024-01-11 VITALS
WEIGHT: 193 LBS | TEMPERATURE: 98.3 F | OXYGEN SATURATION: 98 % | HEART RATE: 74 BPM | HEIGHT: 71 IN | RESPIRATION RATE: 14 BRPM | DIASTOLIC BLOOD PRESSURE: 90 MMHG | BODY MASS INDEX: 27.02 KG/M2 | SYSTOLIC BLOOD PRESSURE: 130 MMHG

## 2024-01-11 DIAGNOSIS — D72.829 LEUKOCYTOSIS, UNSPECIFIED TYPE: ICD-10-CM

## 2024-01-11 DIAGNOSIS — I10 PRIMARY HYPERTENSION: ICD-10-CM

## 2024-01-11 DIAGNOSIS — Z01.818 PREOP EXAMINATION: Primary | ICD-10-CM

## 2024-01-11 DIAGNOSIS — J45.40 MODERATE PERSISTENT ASTHMA WITHOUT COMPLICATION: ICD-10-CM

## 2024-01-11 DIAGNOSIS — S46.011D TRAUMATIC COMPLETE TEAR OF RIGHT ROTATOR CUFF, SUBSEQUENT ENCOUNTER: ICD-10-CM

## 2024-01-11 LAB
ERYTHROCYTE [DISTWIDTH] IN BLOOD BY AUTOMATED COUNT: 13.8 % (ref 11.6–15.1)
HCT VFR BLD AUTO: 50.3 % (ref 36.5–49.3)
HGB BLD-MCNC: 16.5 G/DL (ref 12–17)
MCH RBC QN AUTO: 31.9 PG (ref 26.8–34.3)
MCHC RBC AUTO-ENTMCNC: 32.8 G/DL (ref 31.4–37.4)
MCV RBC AUTO: 97 FL (ref 82–98)
PLATELET # BLD AUTO: 317 THOUSANDS/UL (ref 149–390)
PMV BLD AUTO: 10 FL (ref 8.9–12.7)
RBC # BLD AUTO: 5.17 MILLION/UL (ref 3.88–5.62)
WBC # BLD AUTO: 8.7 THOUSAND/UL (ref 4.31–10.16)

## 2024-01-11 PROCEDURE — 85027 COMPLETE CBC AUTOMATED: CPT

## 2024-01-11 PROCEDURE — 99215 OFFICE O/P EST HI 40 MIN: CPT | Performed by: FAMILY MEDICINE

## 2024-01-11 PROCEDURE — 36415 COLL VENOUS BLD VENIPUNCTURE: CPT

## 2024-01-11 RX ORDER — AMLODIPINE BESYLATE 5 MG/1
5 TABLET ORAL DAILY
Start: 2024-01-11 | End: 2024-04-10

## 2024-01-11 RX ORDER — FLUTICASONE FUROATE AND VILANTEROL 100; 25 UG/1; UG/1
1 POWDER RESPIRATORY (INHALATION) DAILY
Qty: 180 BLISTER | Refills: 0 | Status: SHIPPED | OUTPATIENT
Start: 2024-01-11 | End: 2024-04-10

## 2024-01-11 NOTE — PROGRESS NOTES
"Chief Complaint   Patient presents with   • Pre-op Exam     Per Dr Irwin rt rotator cuff 1-17-24   • Medication Refill     Pt states [facility] pharmacy out of advair has nor been taking        Patient ID: Wander Lechuga is a 58 y.o. male.    HPI  Pt is seeing for preop exam prior to R shoulder rotator cuff repair  -  has Asthma and HTN -  BP at home in 140-150/90s recently -  taking Norvasc 2.5 mg daily     The following portions of the patient's history were reviewed and updated as appropriate: allergies, current medications, past family history, past medical history, past social history, past surgical history and problem list.    Review of Systems   Constitutional: Negative.    HENT:  Positive for congestion (residual after nasal surgery 2 wks ago).    Respiratory: Negative.     Cardiovascular: Negative.    Gastrointestinal: Negative.    Genitourinary: Negative.    Musculoskeletal:  Positive for arthralgias (R shoulder).   Skin: Negative.    Neurological: Negative.    Psychiatric/Behavioral: Negative.         Current Outpatient Medications   Medication Sig Dispense Refill   • amLODIPine (NORVASC) 2.5 mg tablet TAKE 1 TABLET BY MOUTH EVERY DAY 90 tablet 1   • Fluticasone Furoate-Vilanterol 100-25 mcg/actuation inhaler Inhale 1 puff daily Rinse mouth after use. 180 blister 0     No current facility-administered medications for this visit.       Objective:    /90 (BP Location: Left arm, Patient Position: Sitting, Cuff Size: Standard)   Pulse 74   Temp 98.3 °F (36.8 °C) (Temporal)   Resp 14   Ht 5' 11\" (1.803 m)   Wt 87.5 kg (193 lb)   SpO2 98%   BMI 26.92 kg/m²        Physical Exam  Constitutional:       General: He is not in acute distress.     Appearance: He is not ill-appearing.   HENT:      Nose: No congestion or rhinorrhea.      Mouth/Throat:      Pharynx: No oropharyngeal exudate or posterior oropharyngeal erythema.   Cardiovascular:      Rate and Rhythm: Normal rate and regular rhythm.      Heart " sounds: No murmur heard.  Pulmonary:      Effort: Pulmonary effort is normal. No respiratory distress.      Breath sounds: No wheezing, rhonchi or rales.   Abdominal:      Palpations: Abdomen is soft.      Tenderness: There is no abdominal tenderness.   Musculoskeletal:      Right lower leg: No edema.      Left lower leg: No edema.   Neurological:      General: No focal deficit present.      Mental Status: He is alert and oriented to person, place, and time.      Cranial Nerves: No cranial nerve deficit.      Motor: No weakness.      Gait: Gait normal.   Psychiatric:         Mood and Affect: Mood normal.           Labs in chart were reviewed.      Assessment/Plan:         Diagnoses and all orders for this visit:    Preop examination  Cleared for surgery   Moderate persistent asthma without complication  -     Fluticasone Furoate-Vilanterol 100-25 mcg/actuation inhaler; Inhale 1 puff daily Rinse mouth after use. -  will change from Advair due to manufacture shortage     Traumatic complete tear of right rotator cuff, subsequent encounter    Primary hypertension  -    will increase med dose form 2.5 mg to  amLODIPine (NORVASC) 5 mg tablet; Take 1 tablet (5 mg total) by mouth daily    Leukocytosis, unspecified type  -     CBC; Future -  will repeat today -  likely was related to recent ENT surgery         Rto in 3 m for BP check                     Ayla Bruce MD

## 2024-01-16 ENCOUNTER — ANESTHESIA EVENT (OUTPATIENT)
Dept: PERIOP | Facility: HOSPITAL | Age: 59
End: 2024-01-16
Payer: COMMERCIAL

## 2024-01-16 PROBLEM — I10 HTN (HYPERTENSION): Status: ACTIVE | Noted: 2024-01-16

## 2024-01-16 NOTE — ANESTHESIA PREPROCEDURE EVALUATION
Procedure:  Right shoulder arthroscopy, rotator cuff repair, possible arthroscopic tenodesis (Right: Shoulder)    Relevant Problems   CARDIO   (+) HTN (hypertension)      PULMONARY   (+) Asthma      Nervous and Auditory   (+) SNHL (sensorineural hearing loss)      Musculoskeletal and Integument   (+) Traumatic complete tear of right rotator cuff, subsequent encounter      Other   (+) Vaping nicotine dependence, tobacco product   S/p sinus surgery last month. Seen by ENT yesterday in office for continued congestion, and dx'd with chronic sinusitis s/p ethmoid debridement. Rx'd doxycycline x 3 weeks. Continues to smoke. Dr. Irwin aware and ok to proceed.         Physical Exam    Airway    Mallampati score: II  TM Distance: >3 FB  Neck ROM: full     Dental   Comment: Denies loose teeth     Cardiovascular  Cardiovascular exam normal    Pulmonary  Pulmonary exam normal     Other Findings  Portions of exam deferred due to low yield and/or unknown COVID status      Anesthesia Plan  ASA Score- 2     Anesthesia Type- general with ASA Monitors.         Additional Monitors:     Airway Plan: ETT.    Comment: GA with ETT; pre-procedure interscalene block.       Plan Factors-Exercise tolerance (METS): >4 METS.    Chart reviewed.        Patient is a current smoker.  Patient instructed to abstain from smoking on day of procedure.     Obstructive sleep apnea risk education given perioperatively.        Induction- intravenous.    Postoperative Plan- Plan for postoperative opioid use.     Informed Consent- Anesthetic plan and risks discussed with patient.  I personally reviewed this patient with the CRNA. Discussed and agreed on the Anesthesia Plan with the CRNA..

## 2024-01-17 ENCOUNTER — ANESTHESIA (OUTPATIENT)
Dept: PERIOP | Facility: HOSPITAL | Age: 59
End: 2024-01-17
Payer: COMMERCIAL

## 2024-01-17 ENCOUNTER — HOSPITAL ENCOUNTER (OUTPATIENT)
Facility: HOSPITAL | Age: 59
Setting detail: OUTPATIENT SURGERY
Discharge: HOME | End: 2024-01-17
Attending: ORTHOPAEDIC SURGERY | Admitting: ORTHOPAEDIC SURGERY
Payer: COMMERCIAL

## 2024-01-17 VITALS
HEART RATE: 86 BPM | TEMPERATURE: 97.2 F | WEIGHT: 187.39 LBS | DIASTOLIC BLOOD PRESSURE: 84 MMHG | RESPIRATION RATE: 17 BRPM | SYSTOLIC BLOOD PRESSURE: 149 MMHG | BODY MASS INDEX: 26.23 KG/M2 | HEIGHT: 71 IN | OXYGEN SATURATION: 96 %

## 2024-01-17 DIAGNOSIS — S46.011D TRAUMATIC COMPLETE TEAR OF RIGHT ROTATOR CUFF, SUBSEQUENT ENCOUNTER: ICD-10-CM

## 2024-01-17 DIAGNOSIS — Z98.890 S/P RIGHT ROTATOR CUFF REPAIR: Primary | ICD-10-CM

## 2024-01-17 DIAGNOSIS — Z47.89 AFTERCARE FOLLOWING SURGERY OF THE MUSCULOSKELETAL SYSTEM: ICD-10-CM

## 2024-01-17 PROCEDURE — NC001 PR NO CHARGE: Performed by: ORTHOPAEDIC SURGERY

## 2024-01-17 PROCEDURE — 29827 SHO ARTHRS SRG RT8TR CUF RPR: CPT

## 2024-01-17 PROCEDURE — C1713 ANCHOR/SCREW BN/BN,TIS/BN: HCPCS | Performed by: ORTHOPAEDIC SURGERY

## 2024-01-17 PROCEDURE — C9290 INJ, BUPIVACAINE LIPOSOME: HCPCS | Performed by: ANESTHESIOLOGY

## 2024-01-17 PROCEDURE — 29827 SHO ARTHRS SRG RT8TR CUF RPR: CPT | Performed by: ORTHOPAEDIC SURGERY

## 2024-01-17 DEVICE — SP FBRTAK RC FBRTPE BLU & STTPE WH/BLK
Type: IMPLANTABLE DEVICE | Site: SHOULDER | Status: FUNCTIONAL
Brand: ARTHREX®

## 2024-01-17 DEVICE — 4.75MM BC KNOTLESS SWIVELOCK
Type: IMPLANTABLE DEVICE | Site: SHOULDER | Status: FUNCTIONAL
Brand: ARTHREX®

## 2024-01-17 DEVICE — SP FBRTAK RC FBRTPE BLK/BLU & STTPE BLU
Type: IMPLANTABLE DEVICE | Site: SHOULDER | Status: FUNCTIONAL
Brand: ARTHREX®

## 2024-01-17 RX ORDER — PROPOFOL 10 MG/ML
INJECTION, EMULSION INTRAVENOUS AS NEEDED
Status: DISCONTINUED | OUTPATIENT
Start: 2024-01-17 | End: 2024-01-17

## 2024-01-17 RX ORDER — ACETAMINOPHEN 500 MG
1000 TABLET ORAL EVERY 8 HOURS
Qty: 60 TABLET | Refills: 0 | Status: SHIPPED | OUTPATIENT
Start: 2024-01-17

## 2024-01-17 RX ORDER — MAGNESIUM HYDROXIDE 1200 MG/15ML
LIQUID ORAL AS NEEDED
Status: DISCONTINUED | OUTPATIENT
Start: 2024-01-17 | End: 2024-01-17 | Stop reason: HOSPADM

## 2024-01-17 RX ORDER — LIDOCAINE HYDROCHLORIDE 10 MG/ML
INJECTION, SOLUTION EPIDURAL; INFILTRATION; INTRACAUDAL; PERINEURAL AS NEEDED
Status: DISCONTINUED | OUTPATIENT
Start: 2024-01-17 | End: 2024-01-17

## 2024-01-17 RX ORDER — ONDANSETRON 2 MG/ML
INJECTION INTRAMUSCULAR; INTRAVENOUS AS NEEDED
Status: DISCONTINUED | OUTPATIENT
Start: 2024-01-17 | End: 2024-01-17

## 2024-01-17 RX ORDER — OXYCODONE HYDROCHLORIDE 5 MG/1
5 TABLET ORAL EVERY 6 HOURS PRN
Qty: 15 TABLET | Refills: 0 | Status: SHIPPED | OUTPATIENT
Start: 2024-01-17 | End: 2024-01-27

## 2024-01-17 RX ORDER — ONDANSETRON 2 MG/ML
4 INJECTION INTRAMUSCULAR; INTRAVENOUS EVERY 6 HOURS PRN
Status: CANCELLED | OUTPATIENT
Start: 2024-01-17

## 2024-01-17 RX ORDER — NAPROXEN 500 MG/1
500 TABLET ORAL 2 TIMES DAILY WITH MEALS
Qty: 20 TABLET | Refills: 0 | Status: SHIPPED | OUTPATIENT
Start: 2024-01-17

## 2024-01-17 RX ORDER — ROCURONIUM BROMIDE 10 MG/ML
INJECTION, SOLUTION INTRAVENOUS AS NEEDED
Status: DISCONTINUED | OUTPATIENT
Start: 2024-01-17 | End: 2024-01-17

## 2024-01-17 RX ORDER — BUPIVACAINE HYDROCHLORIDE 5 MG/ML
INJECTION, SOLUTION EPIDURAL; INTRACAUDAL
Status: COMPLETED | OUTPATIENT
Start: 2024-01-17 | End: 2024-01-17

## 2024-01-17 RX ORDER — ERGOCALCIFEROL 1.25 MG/1
50000 CAPSULE ORAL WEEKLY
Qty: 8 CAPSULE | Refills: 0 | Status: SHIPPED | OUTPATIENT
Start: 2024-01-17

## 2024-01-17 RX ORDER — FENTANYL CITRATE 50 UG/ML
INJECTION, SOLUTION INTRAMUSCULAR; INTRAVENOUS AS NEEDED
Status: DISCONTINUED | OUTPATIENT
Start: 2024-01-17 | End: 2024-01-17

## 2024-01-17 RX ORDER — FENTANYL CITRATE/PF 50 MCG/ML
25 SYRINGE (ML) INJECTION
Status: DISCONTINUED | OUTPATIENT
Start: 2024-01-17 | End: 2024-01-17 | Stop reason: HOSPADM

## 2024-01-17 RX ORDER — MIDAZOLAM HYDROCHLORIDE 2 MG/2ML
INJECTION, SOLUTION INTRAMUSCULAR; INTRAVENOUS
Status: COMPLETED | OUTPATIENT
Start: 2024-01-17 | End: 2024-01-17

## 2024-01-17 RX ORDER — OXYCODONE HYDROCHLORIDE 5 MG/1
5 TABLET ORAL EVERY 4 HOURS PRN
Status: DISCONTINUED | OUTPATIENT
Start: 2024-01-17 | End: 2024-01-17 | Stop reason: HOSPADM

## 2024-01-17 RX ORDER — DEXAMETHASONE SODIUM PHOSPHATE 10 MG/ML
INJECTION, SOLUTION INTRAMUSCULAR; INTRAVENOUS AS NEEDED
Status: DISCONTINUED | OUTPATIENT
Start: 2024-01-17 | End: 2024-01-17

## 2024-01-17 RX ORDER — ONDANSETRON 2 MG/ML
4 INJECTION INTRAMUSCULAR; INTRAVENOUS ONCE AS NEEDED
Status: DISCONTINUED | OUTPATIENT
Start: 2024-01-17 | End: 2024-01-17 | Stop reason: HOSPADM

## 2024-01-17 RX ORDER — ACETAMINOPHEN 325 MG/1
650 TABLET ORAL EVERY 6 HOURS PRN
Status: CANCELLED | OUTPATIENT
Start: 2024-01-17

## 2024-01-17 RX ORDER — CHLORHEXIDINE GLUCONATE ORAL RINSE 1.2 MG/ML
15 SOLUTION DENTAL ONCE
Status: COMPLETED | OUTPATIENT
Start: 2024-01-17 | End: 2024-01-17

## 2024-01-17 RX ORDER — SODIUM CHLORIDE, SODIUM LACTATE, POTASSIUM CHLORIDE, CALCIUM CHLORIDE 600; 310; 30; 20 MG/100ML; MG/100ML; MG/100ML; MG/100ML
75 INJECTION, SOLUTION INTRAVENOUS CONTINUOUS
Status: DISCONTINUED | OUTPATIENT
Start: 2024-01-17 | End: 2024-01-17 | Stop reason: HOSPADM

## 2024-01-17 RX ORDER — CEFAZOLIN SODIUM 2 G/50ML
2000 SOLUTION INTRAVENOUS ONCE
Status: COMPLETED | OUTPATIENT
Start: 2024-01-17 | End: 2024-01-17

## 2024-01-17 RX ADMIN — ONDANSETRON 4 MG: 2 INJECTION INTRAMUSCULAR; INTRAVENOUS at 11:10

## 2024-01-17 RX ADMIN — PROPOFOL 150 MG: 10 INJECTION, EMULSION INTRAVENOUS at 11:03

## 2024-01-17 RX ADMIN — SODIUM CHLORIDE, SODIUM LACTATE, POTASSIUM CHLORIDE, AND CALCIUM CHLORIDE 75 ML/HR: .6; .31; .03; .02 INJECTION, SOLUTION INTRAVENOUS at 10:24

## 2024-01-17 RX ADMIN — LIDOCAINE HYDROCHLORIDE 50 MG: 10 INJECTION, SOLUTION EPIDURAL; INFILTRATION; INTRACAUDAL; PERINEURAL at 11:03

## 2024-01-17 RX ADMIN — CHLORHEXIDINE GLUCONATE 15 ML: 1.2 RINSE ORAL at 08:41

## 2024-01-17 RX ADMIN — BUPIVACAINE 10 ML: 13.3 INJECTION, SUSPENSION, LIPOSOMAL INFILTRATION at 10:48

## 2024-01-17 RX ADMIN — MIDAZOLAM 2 MG: 1 INJECTION INTRAMUSCULAR; INTRAVENOUS at 10:48

## 2024-01-17 RX ADMIN — ROCURONIUM BROMIDE 50 MG: 10 INJECTION, SOLUTION INTRAVENOUS at 11:06

## 2024-01-17 RX ADMIN — CEFAZOLIN SODIUM 2000 MG: 2 SOLUTION INTRAVENOUS at 11:00

## 2024-01-17 RX ADMIN — BUPIVACAINE HYDROCHLORIDE 10 ML: 5 INJECTION, SOLUTION EPIDURAL; INTRACAUDAL; PERINEURAL at 10:48

## 2024-01-17 RX ADMIN — DEXAMETHASONE SODIUM PHOSPHATE 10 MG: 10 INJECTION, SOLUTION INTRAMUSCULAR; INTRAVENOUS at 11:10

## 2024-01-17 RX ADMIN — SUGAMMADEX 200 MG: 100 INJECTION, SOLUTION INTRAVENOUS at 12:25

## 2024-01-17 RX ADMIN — FENTANYL CITRATE 100 MCG: 50 INJECTION, SOLUTION INTRAMUSCULAR; INTRAVENOUS at 11:03

## 2024-01-17 RX ADMIN — PROPOFOL 50 MG: 10 INJECTION, EMULSION INTRAVENOUS at 11:06

## 2024-01-17 NOTE — H&P
H&P reviewed. After examining the patient I find the following changes in the patients condition since the H&P had been written:    The patient did have a sinus surgery last month. He was seen in clinic yesterday and had a bedside procedure to clear some material from the sinus. I spoke with Dr. Barkley directly who confirmed that there was no concern for infection during the procedure and that the patient is healing normally. We will plan to proceed with scheduled surgery today to include right shoulder arthroscopic rotator cuff repair.     Vitals:    01/17/24 0842   BP: 142/90   Pulse: 74   Resp: 18   Temp: 98.2 °F (36.8 °C)   SpO2: 98%

## 2024-01-17 NOTE — ANESTHESIA POSTPROCEDURE EVALUATION
Post-Op Assessment Note    CV Status:  Stable  Pain Score: 0    Pain management: adequate       Mental Status:  Sleepy   Hydration Status:  Stable   PONV Controlled:  None   Airway Patency:  Patent     Post Op Vitals Reviewed: Yes      Staff: Anesthesiologist, CRNA               BP   155/87   Temp      Pulse  70   Resp   14   SpO2   98

## 2024-01-17 NOTE — DISCHARGE INSTR - AVS FIRST PAGE
POSTOPERATIVE INSTRUCTIONS following SHOULDER SURGERY    MEDICATIONS:  Resume all home medications unless otherwise instructed by your surgeon.  Take 50,000 IU Vitamin D capsule once weekly for 8 weeks following surgery.  Pain Medication:   Take Tylenol and Naproxen on prescribed schedule for 7 days  Take Oxycodone as needed for severe pain   If you were given a regional anesthetic (nerve block), it is helpful to take your pain medication before the block wear off.    Possible side effects include nausea, constipation, and urinary retention.  If you experience these side effects, please call our office for assistance.  Pain med refills are authorized only during office hours (8am-4pm, Mon-Fri).    WOUND CARE:  Keep the dressing clean and dry.  Light drainage may occur the first 2 days postop.  You may remove the dressings and get the incision wet in the shower 72 hours after surgery.  DO NOT remove steri-strips or sutures.  DO NOT immerse the incision under water.  Carefully pat the incision dry.  If there is wound drainage, re-apply a fresh dry gauze dressing.  Please call our office ([phone]) if you experience either of the following:  Sudden increase in swelling, redness, or warmth at the surgical site  Excessive incisional drainage that persists beyond the 3rd day after surgery  Oral temperature greater than 101 degrees, not relieved with Tylenol  Shortness of breath, chest pain, nausea, or any other concerning symptoms    ICE:  You may use Ice to help with pain control   Place ice on the operative site for 20 minutes at a time when you are in pain     SLING:  Wear your sling AT ALL TIMES (including sleep) until your first postoperative office visit.  You may remove the sling for showering but must keep your arm at your side.    ACTIVITY:   DO NOT lift, carry, push, or pull anything with your operative arm.  Shoulder:  DO NOT actively (on your own) raise your operative arm away from the side of you body or  rotate it out away from your body unless instructed by your surgeon or physical therapist.  Place a pillow behind the elbow while lying down.  Sleeping in a more upright position (recliner) may be more comfortable initially.  Elbow, Wrist, and Finger Motion:  You may take your elbow out of the slight carefully to move your elbow, wrist, and fingers. Follow your motion precautions for the shoulder. Replace the sling immediately after.     PHYSICAL THERAPY:  Begin therapy 5 TO 7 DAYS AFTER SURGERY.  You were given a prescription for therapy at your preoperative office visit or on the day of surgery.  If you do not have physical therapy scheduled yet, please call our office for assistance.    FOLLOW-UP APPOINTMENT:  10-14 days after surgery with:      Dr. Naldo Irwin MD    [facility]  [address], [address]  [place] [address]    [facility] [address] [address]    Phone:   [phone]

## 2024-01-17 NOTE — ANESTHESIA PROCEDURE NOTES
Peripheral Block    Patient location during procedure: holding area  Start time: 1/17/2024 10:48 AM  Reason for block: at surgeon's request and post-op pain management  Staffing  Performed by: Paula Verduzco MD  Authorized by: Paula Verduzco MD    Preanesthetic Checklist  Completed: patient identified, IV checked, site marked, risks and benefits discussed, surgical consent, monitors and equipment checked, pre-op evaluation and timeout performed  Peripheral Block  Patient position: supine  Prep: ChloraPrep  Patient monitoring: frequent blood pressure checks, continuous pulse oximetry and heart rate  Block type: Interscalene  Laterality: right  Injection technique: single-shot  Procedures: ultrasound guided, Ultrasound guidance required for the procedure to increase accuracy and safety of medication placement and decrease risk of complications.  Ultrasound permanent image savedbupivacaine (PF) (MARCAINE) 0.5 % injection 20 mL - Perineural   10 mL - 1/17/2024 10:48:00 AM  bupivacaine liposomal (EXPAREL) 1.3 % injection 20 mL - Perineural   10 mL - 1/17/2024 10:48:00 AM  midazolam (VERSED) injection 0.5 mg - Intravenous   2 mg - 1/17/2024 10:48:00 AM  Needle  Needle type: Stimuplex   Needle gauge: 20 G  Needle length: 4 in  Needle localization: anatomical landmarks and ultrasound guidance  Assessment  Injection assessment: incremental injection, frequent aspiration, injected with ease, negative aspiration, negative for heart rate change, no paresthesia on injection, no symptoms of intraneural/intravenous injection and needle tip visualized at all times  Paresthesia pain: none  Post-procedure:  site cleaned  patient tolerated the procedure well with no immediate complications

## 2024-01-17 NOTE — PERIOPERATIVE NURSING NOTE
Pt's right shoulder dressing remains dry and intact, arm in sling, finger with brisk capillary refill, tingling and numbness from nerve block.  Criteria met for discharge.  IV removed.  Instructions discussed with patient and wife, all questions and concerns addressed.  Pt assisted with getting dressed, wife observing process and verbalizing understanding.     07/25/2020

## 2024-01-17 NOTE — OP NOTE
OPERATIVE REPORT  PATIENT NAME: Wander Lechuga    :  1965  MRN: [id number]  Pt Location: [facility] OR ROOM 01    SURGERY DATE: 2024    Surgeons and Role:     * Jose Rafael Irwin MD - Primary     * Meche Salcedo PA-C - Assisting    The presence of Meche Salcedo PA-C was required for poisitioning, retraction, assistance, and closure. No qualified resident was available.      Preop Diagnosis:  Traumatic complete tear of right rotator cuff, subsequent encounter [S46.011D]    Post-Op Diagnosis Codes:     * Traumatic complete tear of right rotator cuff, subsequent encounter [S46.011D]    Procedure(s):  Right - Right shoulder arthroscopic rotator cuff repair    Specimen(s):  * No specimens in log *    Estimated Blood Loss:   Minimal    Drains:  * No LDAs found *    Anesthesia Type:   General w/ Interscalene Block    Implants:   Implant Name Type Inv. Item Serial No.  Lot No. LRB No. Used Action   ANCHOR SUT 2.6 FIBERTAK  RC 1.7MMTGRTPE BLUE 1.3MM STTPE WH/BLK SP - [id number]  ANCHOR SUT 2.6 FIBERTAK  RC 1.7MMTGRTPE BLUE 1.3MM STTPE WH/BLK SP  [employer] [id number] Right 1 Implanted   ANCHOR SUT 2.6 FIBERTAK  RC 1.7MMTGRTPE BLK/BL 1.3MM STTPE BL/BL SP - [id number]  ANCHOR SUT 2.6 FIBERTAK  RC 1.7MMTGRTPE BLK/BL 1.3MM STTPE BL/BL SP  [employer] [id number] Right 1 Implanted   ANCHOR SUT 4.75 X 19.1MM KNOTTLESS  SWIVELOCK STRL - [id number]  ANCHOR SUT 4.75 X 19.1MM KNOTTLESS  SWIVELOCK STRL  [employer] [id number] Right 1 Implanted   ANCHOR SUT 4.75 X 19.1MM KNOTTLESS  SWIVELOCK STRL - [id number]  ANCHOR SUT 4.75 X 19.1MM KNOTTLESS  SWIVELOCK STRL  [employer] [id number] Right 1 Implanted           Operative Indications:  58 y.o. male with a traumatic full-thickness tear tear of the right rotator cuff.  The tear was traumatic in nature and he had normal shoulder function prior to the injury.  After the injury he had significant difficulty with any overhead or reaching activity and had significant weakness  on exam with rotator cuff testing.  Given this traumatic nature of the injury and significant weakness I did recommend early arthroscopic rotator cuff repair.  We did discuss the possibility of nonoperative treatment but I believe this would have inferior outcomes compared to repair.  Risks, benefits, and alternatives to surgery were discussed. The patient expressed understanding and elected to proceed with surgery    Seizure in detail:    The patient was greeted in the preoperative holding area, where history, physical exam, review of surgical plan, and operative site ajit were performed. The patient was transferred to the operative suite, placed supine on the operative table.  General endotracheal anesthesia was induced, and perioperative intravenous antibiotics were administered.  The patient was safely repositioned to the beach-chair position with cervical spine in neutral position. All bony prominences were well padded. SCDs were placed on bilateral lower extremities.      Exam under anesthesia was then performed which demonstrated maintained range of motion that was symmetric to the contralateral side.  Stability exam showed no evidence of instability with load and shift.      The operative shoulder was prepped and draped in usual sterile fashion. A time out was performed with the participation of the entire operative and anesthesia team. Standard posterior glenohumeral and anterior rotator interval portals were established, through which the scope and probe were inserted respectively, which aided and assisted in performance of a complete and thorough diagnostic arthroscopy, which demonstrated the following findings:    Operative Findings:  Humeral head: Grade 1 chondral changes  Glenoid: Grade 1 chondral changes  Biceps tendon and biceps anchor: No significant tearing of the biceps or instability the biceps anchor.  There was a Kyleigh variant present  Rotator Cuff: Full-thickness tear of the supraspinatus.   Subscapularis infraspinatus and teres remain intact.  Glenoid Labrum degenerative fraying without focal tearing  Subacromial showed minimal bursitis and no significant subacromial spur    After diagnostic arthroscopy I did debride some hypertrophic synovium and residual tendon from the greater tuberosity.  All debris was removed from the glenohumeral joint.      I then placed the arthroscope into the subacromial space. After localization with a spinal needle, I made a lateral incision to establish a lateral portal.  I performed an extensive bursectomy , removed the soft tissue from the undersurface of the acromion.      After bursectomy was completed evaluation of the rotator cuff again showed full thickness tear of the supraspinatus.  A posterolateral viewing portal was created and the scope was moved to the portal.  The tendon was evaluated and had excellent mobility and the tissue quality was excellent.  Tissue was easily reducible to the footprint on the greater tuberosity.  Using a rivas I created a bleeding bed at the greater tuberosity.      I placed a cannula through the lateral incision and utilized this portal to placed anchors in the greater tuberosity.  2 Medial Row all suture arthrex  anchors were placed immediately lateral to the articular margin on the greater tuberosity foot print. Sutures were shuttled separately out the front and back portals respectively.  Using a scorpion suture passer the sutures from the posterior anchor were passed through the posterior portion of the supraspinatus just lateral to the musculotendinous  junction.  Then the anterior sutures were passed through the anterior margin of the tear just lateral to the musculotendinous junction.  Traction on the sutures showed good tendon mobility and confirmed ability to reduce the tendon to the greater tuberosity foot print.     In order to complete a knotless double row repair, the lateral aspect of the tuberosity was debrided of soft  tissue using the radiofrequency ablator. Through the lateral cannula the punch was placed and tapped into position.  One Suture  from each medial row anchor was passed through a swivel lock anchor. The anchor was then placed through the cannulae and into the previously punched hole.  Once anchor was in the hole the sutures were tightened.  With the help of a mallet the anchor was tapped down to the bone.   Sutures were again tensioned and the anchor was screwed into place.  Next the suture cutter was used to cut the excess suture.  This process was repeated for a second lateral row anchor roughly 2 cm anterior to the first.  The rotator cuff repair was then evaluated with a probe and the scope and felt to have excellent reduction onto the greater tuberosity.  There was a small dog ear anterior. A knotless repair stitch from the lateral corkscrew anchor was passed through this portion of tendon, shuttled through the anchor, and secured. This provided excellent reduction of the dog ear.  The repair was stable during humeral motion and with evaluation with the probe.      The shoulder was copiously irrigated and drained. The arthroscopic wounds were closed with 3-0 Monocryl sutures and Steri  strips. A sterile dressing and shoulder immobilizer were placed on the operative extremity.  The patient emerged from anesthesia and was taken to the recovery room in stable condition.  There were no apparent complications.        SIGNATURE: Jose Rafael Irwin MD  DATE: January 17, 2024  TIME: 12:39 PM

## 2024-01-17 NOTE — PERIOPERATIVE NURSING NOTE
Received patient to [facility] from PACU, alert, VSS.  Pt reporting mild discomfort at right shoulder, able to move fingers of right hand, fingers remain numb and tingling.  Right shoulder in sling, dressing dry and intact.  Tolerating po fluids.  Wife with patient in room.

## 2024-01-25 ENCOUNTER — EVALUATION (OUTPATIENT)
Dept: PHYSICAL THERAPY | Facility: CLINIC | Age: 59
End: 2024-01-25
Payer: COMMERCIAL

## 2024-01-25 DIAGNOSIS — S46.011D TRAUMATIC COMPLETE TEAR OF RIGHT ROTATOR CUFF, SUBSEQUENT ENCOUNTER: Primary | ICD-10-CM

## 2024-01-25 DIAGNOSIS — Z98.890 S/P RIGHT ROTATOR CUFF REPAIR: ICD-10-CM

## 2024-01-25 DIAGNOSIS — Z47.89 AFTERCARE FOLLOWING SURGERY OF THE MUSCULOSKELETAL SYSTEM: ICD-10-CM

## 2024-01-25 PROCEDURE — 97161 PT EVAL LOW COMPLEX 20 MIN: CPT

## 2024-01-25 NOTE — PROGRESS NOTES
PT Evaluation     Today's date: 2024  Patient name: Wander Lechuga  : 1965  MRN: [id number]  Referring provider: Meche Salcedo PA-C  Dx:   Encounter Diagnosis     ICD-10-CM    1. Traumatic complete tear of right rotator cuff, subsequent encounter  S46.011D Ambulatory referral to Physical Therapy      2. S/P right rotator cuff repair  Z98.890 Ambulatory referral to Physical Therapy      3. Aftercare following surgery of the musculoskeletal system  Z47.89 Ambulatory referral to Physical Therapy                     Assessment  Assessment details: Wander Lechuga is a 58 y.o. male who presents to physical therapy with pain, decreased UE range of motion, decreased UE strength, impaired function, and decreased activity tolerance. Patient's clinical presentation is consistent with their referring diagnosis of Traumatic complete tear of right rotator cuff, subsequent encounter  (primary encounter diagnosis), S/P right rotator cuff repair,  and Aftercare following surgery of the musculoskeletal system. The pt presents with functional limitations of ADLs, recreational activities, participation in social activities, ambulation, performing household chores, self-care, and reaching. Pt would benefit from physical therapy services to address these limitations and maximize function. Pt was instructed and educated on home exercise program and post-op contraindications/precautions today and demonstrates understanding.   Impairments: abnormal muscle firing, abnormal or restricted ROM, abnormal movement, activity intolerance, impaired physical strength, lacks appropriate home exercise program, pain with function and scapular dyskinesis    Symptom irritability: moderateUnderstanding of Dx/Px/POC: good   Prognosis: good    Goals  Short Term Goals (6-8 weeks)  Pt will be independent in basic Home Exercise Program  Pt will demonstrate improved right shoulder PROM by 25 degrees in all directions  Pt will be able to sleep  through night without waking up from pain  Pt will be able to initiate AROM of shoulder per post op protocol    Long Term Goals (10-12+ weeks)  Pt will be independent in comprehensive Home Exercise Program  Pt will be able to perform all ADLs with pain no more than 2/10  Pt will demonstrate improved shoulder MMT strength to at least 4+/5  Pt will demonstrate improved FOTO status score by 10 points  Update goals dependent on patient progression       Plan  Patient would benefit from: skilled PT  Referral necessary: No  Planned modality interventions: cryotherapy and thermotherapy: hydrocollator packs  Planned therapy interventions: ADL retraining, body mechanics training, functional ROM exercises, home exercise program, graded exercise, joint mobilization, manual therapy, massage, neuromuscular re-education, patient education, postural training, strengthening, stretching, therapeutic activities, therapeutic exercise and therapeutic training  Frequency: 2x week  Duration in weeks: 6  Treatment plan discussed with: patient        Subjective Evaluation    History of Present Illness  Date of surgery: 2024  Mechanism of injury: surgery  Mechanism of injury: Pt is s/p right shoulder arthroscopic surgery on 24. Pt reports right shoulder pain that started when being pulled by dog then feeling a pop when lifting something onto his truck a couple of months ago. MRI was taken revealing a rotator cuff tear. He decided to proceed with surgery. Pt reports is pain has been manageable since surgery using pain meds and icing. Has been compliant with sling use. Reports independence with dressing except for donning socks. Moderate sleep disturbances present. Sleeping in recliner.   Patient Goals  Patient goals for therapy: decreased pain, increased motion, increased strength, independence with ADLs/IADLs and return to sport/leisure activities    Pain  Current pain ratin  At best pain ratin  At worst pain rating:  7  Location: right shoulder  Quality: dull ache, sharp and tight  Relieving factors: ice, medications and change in position  Exacerbated by: sleeping.    Social Support    Employment status: working ([employer])  Exercise history: cardio, walking, house work      Diagnostic Tests  MRI studies: abnormal  Treatments  Current treatment: immobilization and medication        Objective     Postural Observations  Seated posture: good  Standing posture: good      Observations     Right Shoulder  Positive for incision.     Additional Observation Details  Right shoulder sling in use    Cervical/Thoracic Screen   Cervical range of motion within normal limits    Neurological Testing     Sensation     Shoulder   Left Shoulder   Intact: light touch    Right Shoulder   Intact: Light touch    Active Range of Motion   Left Shoulder   Flexion: 175 degrees   Abduction: 180 degrees   External rotation 45°: 75 degrees   Internal rotation 45°: 70 degrees     Additional Active Range of Motion Details  AROM of right shoulder deferred secondary to post op precaution  Right elbow and wrist WNL    Passive Range of Motion     Right Shoulder   Flexion: 90 degrees with pain  Abduction: 50 degrees with pain  External rotation 45°: 10 degrees with pain  Internal rotation 45°: 50 degrees     Strength/Myotome Testing     Left Shoulder     Planes of Motion   Flexion: 5   Abduction: 5   External rotation at 0°: 5   Internal rotation at 0°: 5     Additional Strength Details  Resisted motions deferred secondary to post op precautions          Pt was given initial Home Exercise Program today and demonstrates understanding. [employer] access code: [email or web address]       Precautions RC post op protocol       Manuals 1/25       PROM Right shoulder flex, abd, ER                               Neuro Re-Ed         Scap retraction  x10                                                       Ther Ex        Elbow flex x10        x10       C/S ROM X5 ea                                                Ther Activity                        Gait Training                        Modalities                             Insurance:  A/CMS Eval/ Re-eval Auth #/ Referral # Total units  Start date  Expiration date Extension  Visit limitation?  PT only or  PT+OT? Co-Insurance   CMS       BOMN                   POC Start Date POC Expiration Date Signed POC?   1/25/24 3/7/24       Date               Visits/Units:  Used               Authed:  Remaining

## 2024-01-30 ENCOUNTER — OFFICE VISIT (OUTPATIENT)
Dept: PHYSICAL THERAPY | Facility: CLINIC | Age: 59
End: 2024-01-30
Payer: COMMERCIAL

## 2024-01-30 ENCOUNTER — OFFICE VISIT (OUTPATIENT)
Dept: OBGYN CLINIC | Facility: CLINIC | Age: 59
End: 2024-01-30

## 2024-01-30 VITALS
BODY MASS INDEX: 26.18 KG/M2 | HEART RATE: 74 BPM | HEIGHT: 71 IN | SYSTOLIC BLOOD PRESSURE: 150 MMHG | WEIGHT: 187 LBS | DIASTOLIC BLOOD PRESSURE: 89 MMHG

## 2024-01-30 DIAGNOSIS — S46.011D TRAUMATIC COMPLETE TEAR OF RIGHT ROTATOR CUFF, SUBSEQUENT ENCOUNTER: Primary | ICD-10-CM

## 2024-01-30 DIAGNOSIS — Z98.890 S/P RIGHT ROTATOR CUFF REPAIR: ICD-10-CM

## 2024-01-30 PROCEDURE — 97140 MANUAL THERAPY 1/> REGIONS: CPT

## 2024-01-30 PROCEDURE — 97110 THERAPEUTIC EXERCISES: CPT

## 2024-01-30 PROCEDURE — 99024 POSTOP FOLLOW-UP VISIT: CPT | Performed by: ORTHOPAEDIC SURGERY

## 2024-01-30 NOTE — PROGRESS NOTES
SUBJECTIVE:  Patient is a 58 y.o. year old male who presents for follow up now 13 days s/p  Right shoulder arthroscopy, rotator cuff repair - Right performed on  1/17/2024.  Today patient has well-controlled pain.  He is very happy with his early progress.  He has started physical therapy which she likes.  He has been compliant with his brace wear.  He denies any significant numbness or tingling.    Baseline surveys were completed and patient IQ      VITALS:  Vitals:    01/30/24 0847   BP: 150/89   Pulse: 74       PHYSICAL EXAMINATION:  General: well developed and well nourished, alert, oriented times 3, and appears comfortable  Psychiatric: Normal    MUSCULOSKELETAL EXAMINATION:    Incision:    Clean, dry, and intact, sutures removed  Range of Motion: External rotation to 30 passively, forward flexion to 70 passively.  Not tested  Neurovascular status: Positive axillary/AIN/PIN/ulnar motor  Sensation intact to light touch  Palpable radial pulse      PLAN:    We reviewed operative pictures and reviewed the procedure in detail. I believe the patient is doing very well so far.  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  Reviewed restrictions and brace wear instructions.    Continue physical therapy per provided protocol.     Take over-the-counter medications for pain control.     Follow up in 4 weeks.  We will discontinue the sling after that visit.

## 2024-01-30 NOTE — PROGRESS NOTES
Daily Note     Today's date: 2024  Patient name: Wander Lechuga  : 1965  MRN: [id number]  Referring provider: Meche Salcedo PA-C  Dx:   Encounter Diagnoses   Name Primary?    Traumatic complete tear of right rotator cuff, subsequent encounter Yes    S/P right rotator cuff repair                   Subjective: Pt reports 0/10 pain this morning. Taking tylenol for pain management. Able to sleep better.       Objective: See treatment diary below      Assessment: Pt tolerated treatment well with minimal complaints of pain. Cueing provided to minimize muscle guarding with PROM of shoulder. Reviewed initial HEP with patient and given updated, demonstrates independence.      Plan: Continue with plan of care to decrease pain, improve mobility, strength, and function.          Precautions RC post op protocol       Manuals       PROM Right shoulder flex, abd, ER Right shoulder flex, abd, ER      Mobilization   GHJ distraction gr II                      Neuro Re-Ed         Scap retraction  x10 Hold 5, 2x10                                                       Ther Ex        Elbow flex x10 x10       x10       C/S ROM X5 ea       Cane ER  Passive x15      Self PROM flexion  x10      pendulums  2 way x10 ea                      Ther Activity                        Gait Training                        Modalities                             Insurance:  AMA/CMS Eval/ Re-eval Auth #/ Referral # Total units  Start date  Expiration date Extension  Visit limitation?  PT only or  PT+OT? Co-Insurance   CMS       BOMN                   POC Start Date POC Expiration Date Signed POC?   1/25/24 3/7/24       Date               Visits/Units:  Used               Authed:  Remaining

## 2024-02-01 ENCOUNTER — OFFICE VISIT (OUTPATIENT)
Dept: PHYSICAL THERAPY | Facility: CLINIC | Age: 59
End: 2024-02-01
Payer: COMMERCIAL

## 2024-02-01 DIAGNOSIS — S46.011D TRAUMATIC COMPLETE TEAR OF RIGHT ROTATOR CUFF, SUBSEQUENT ENCOUNTER: Primary | ICD-10-CM

## 2024-02-01 DIAGNOSIS — Z98.890 S/P RIGHT ROTATOR CUFF REPAIR: ICD-10-CM

## 2024-02-01 PROCEDURE — 97140 MANUAL THERAPY 1/> REGIONS: CPT

## 2024-02-01 PROCEDURE — 97110 THERAPEUTIC EXERCISES: CPT

## 2024-02-01 NOTE — PROGRESS NOTES
Daily Note     Today's date: 2024  Patient name: Wander Lechuga  : 1965  MRN: [id number]  Referring provider: Meche Salcedo PA-C  Dx:   Encounter Diagnoses   Name Primary?    Traumatic complete tear of right rotator cuff, subsequent encounter Yes    S/P right rotator cuff repair                   Subjective: Pt reports 0/10 pain in shoulder this morning. Has been compliant with HEP. Tried sleeping in bed last night but was uncomfortable.       Objective: See treatment diary below      Assessment: Pt tolerated treatment well with minimal complaints of pain. Pt demonstrating appropriate improvement of shoulder PROM in all directions. Improved relaxation with ranging and minimal guarding present.       Plan: Continue with plan of care to decrease pain, improve mobility, strength, and function.          Precautions RC post op protocol       Manuals      PROM Right shoulder flex, abd, ER Right shoulder flex, abd, ER Right shoulder flex, abd, ER     Mobilization   GHJ distraction gr II GHJ distraction gr II                     Neuro Re-Ed         Scap retraction  x10 Hold 5, 2x10                                                       Ther Ex        Elbow flex x10 x10       x10       C/S ROM X5 ea       Cane ER  Passive x15 Hold 5, 2x10      Self PROM flexion  x10 Hold 5, 2x10      pendulums  2 way x10 ea 2 way x20 ea                     Ther Activity                        Gait Training                        Modalities                             Insurance:  AMA/CMS Eval/ Re-eval Auth #/ Referral # Total units  Start date  Expiration date Extension  Visit limitation?  PT only or  PT+OT? Co-Insurance   CMS       BOMN                   POC Start Date POC Expiration Date Signed POC?   1/25/24 3/7/24       Date               Visits/Units:  Used               Authed:  Remaining

## 2024-02-06 ENCOUNTER — OFFICE VISIT (OUTPATIENT)
Dept: PHYSICAL THERAPY | Facility: CLINIC | Age: 59
End: 2024-02-06
Payer: COMMERCIAL

## 2024-02-06 DIAGNOSIS — Z47.89 AFTERCARE FOLLOWING SURGERY OF THE MUSCULOSKELETAL SYSTEM: ICD-10-CM

## 2024-02-06 DIAGNOSIS — S46.011D TRAUMATIC COMPLETE TEAR OF RIGHT ROTATOR CUFF, SUBSEQUENT ENCOUNTER: ICD-10-CM

## 2024-02-06 DIAGNOSIS — Z98.890 S/P RIGHT ROTATOR CUFF REPAIR: ICD-10-CM

## 2024-02-06 DIAGNOSIS — S46.011D TRAUMATIC COMPLETE TEAR OF RIGHT ROTATOR CUFF, SUBSEQUENT ENCOUNTER: Primary | ICD-10-CM

## 2024-02-06 PROCEDURE — 97110 THERAPEUTIC EXERCISES: CPT

## 2024-02-06 PROCEDURE — 97140 MANUAL THERAPY 1/> REGIONS: CPT

## 2024-02-06 NOTE — PROGRESS NOTES
Daily Note     Today's date: 2024  Patient name: Wander Lechuga  : 1965  MRN: [id number]  Referring provider: Meche Salcedo PA-C  Dx:   Encounter Diagnoses   Name Primary?    Traumatic complete tear of right rotator cuff, subsequent encounter Yes    S/P right rotator cuff repair                   Subjective: Pt reports significant 6-7/10 pain in the middle of the night sleeping on the recliner.       Objective: See treatment diary below      Assessment: Pt tolerated treatment well with minimal complaints of pain. Progressing with PROM of shoulder in all directions. Recommended pt perform exercises before bed to minimize stiffness overnight.       Plan: Continue with plan of care to decrease pain, improve mobility, strength, and function.          Precautions RC post op protocol       Manuals     PROM Right shoulder flex, abd, ER Right shoulder flex, abd, ER Right shoulder flex, abd, ER Right shoulder flex, abd, ER    Mobilization   GHJ distraction gr II GHJ distraction gr II GHJ distraction gr II, ST retraction gr III                    Neuro Re-Ed         Scap retraction  x10 Hold 5, 2x10   Hold 5, 2x10                                                     Ther Ex        Elbow flex x10 x10       x10       C/S ROM X5 ea       Cane ER  Passive x15 Hold 5, 2x10  Hold 5, 2x10     Self PROM flexion  x10 Hold 5, 2x10  Hold 5, 2x10     pendulums  2 way x10 ea 2 way x20 ea 2 way x20 ea                    Ther Activity                        Gait Training                        Modalities                             Insurance:  AMA/CMS Eval/ Re-eval Auth #/ Referral # Total units  Start date  Expiration date Extension  Visit limitation?  PT only or  PT+OT? Co-Insurance   CMS       BOMN                   POC Start Date POC Expiration Date Signed POC?   1/25/24 3/7/24       Date               Visits/Units:  Used               Authed:  Remaining

## 2024-02-07 DIAGNOSIS — I10 PRIMARY HYPERTENSION: ICD-10-CM

## 2024-02-07 RX ORDER — AMLODIPINE BESYLATE 2.5 MG/1
TABLET ORAL
Qty: 90 TABLET | Refills: 1 | Status: SHIPPED | OUTPATIENT
Start: 2024-02-07

## 2024-02-08 ENCOUNTER — OFFICE VISIT (OUTPATIENT)
Dept: PHYSICAL THERAPY | Facility: CLINIC | Age: 59
End: 2024-02-08
Payer: COMMERCIAL

## 2024-02-08 DIAGNOSIS — Z98.890 S/P RIGHT ROTATOR CUFF REPAIR: ICD-10-CM

## 2024-02-08 DIAGNOSIS — S46.011D TRAUMATIC COMPLETE TEAR OF RIGHT ROTATOR CUFF, SUBSEQUENT ENCOUNTER: Primary | ICD-10-CM

## 2024-02-08 PROCEDURE — 97140 MANUAL THERAPY 1/> REGIONS: CPT

## 2024-02-08 PROCEDURE — 97110 THERAPEUTIC EXERCISES: CPT

## 2024-02-08 NOTE — PROGRESS NOTES
Daily Note     Today's date: 2024  Patient name: Wander Lechuga  : 1965  MRN: [id number]  Referring provider: Meche Salcedo PA-C  Dx:   Encounter Diagnoses   Name Primary?    Traumatic complete tear of right rotator cuff, subsequent encounter Yes    S/P right rotator cuff repair                   Subjective: Pt reports significant discomfort Tu night in shoulder blade while attempting to sleep.       Objective: See treatment diary below      Assessment: Pt tolerated treatment well with minimal complaints of pain. Progressing well with PROM of shoulder, ER measured at 40 degrees and flexion at 110 degrees. Added passive table slides and ball roll out to HEP.       Plan: Continue with plan of care to decrease pain, improve mobility, strength, and function.          Precautions RC post op protocol       Manuals    PROM Right shoulder flex, abd, ER Right shoulder flex, abd, ER Right shoulder flex, abd, ER Right shoulder flex, abd, ER Right shoulder flex, abd, ER   Mobilization   GHJ distraction gr II GHJ distraction gr II GHJ distraction gr II, ST retraction gr III GHJ distraction gr II, ST retraction gr III   STM     Medial scap muscles           Neuro Re-Ed         Scap retraction  x10 Hold 5, 2x10   Hold 5, 2x10                                                     Ther Ex        Elbow flex x10 x10       x10       C/S ROM X5 ea       Cane ER  Passive x15 Hold 5, 2x10  Hold 5, 2x10  Hold 5, 2x10    Self PROM flexion  x10 Hold 5, 2x10  Hold 5, 2x10  Hold 5, 2x10    pendulums  2 way x10 ea 2 way x20 ea 2 way x20 ea 2 way x20 ea   Table slides     Flex 2x10   Ball roll out     Passive 2x10   Ther Activity                        Gait Training                        Modalities                             Insurance:  AMA/CMS Eval/ Re-eval Auth #/ Referral # Total units  Start date  Expiration date Extension  Visit limitation?  PT only or  PT+OT? Co-Insurance   CMS       BOMN                    POC Start Date POC Expiration Date Signed POC?   1/25/24 3/7/24       Date               Visits/Units:  Used               Authed:  Remaining

## 2024-02-09 RX ORDER — ERGOCALCIFEROL 1.25 MG/1
50000 CAPSULE ORAL WEEKLY
Qty: 12 CAPSULE | Refills: 1 | Status: SHIPPED | OUTPATIENT
Start: 2024-02-09

## 2024-02-09 NOTE — TELEPHONE ENCOUNTER
Patient was initially prescribed Vit D capsules for 3 weeks following surgery. Protocol requires 50,000 IU capsule once weekly x8 weeks.

## 2024-02-13 ENCOUNTER — APPOINTMENT (OUTPATIENT)
Dept: PHYSICAL THERAPY | Facility: CLINIC | Age: 59
End: 2024-02-13
Payer: COMMERCIAL

## 2024-02-15 ENCOUNTER — OFFICE VISIT (OUTPATIENT)
Dept: PHYSICAL THERAPY | Facility: CLINIC | Age: 59
End: 2024-02-15
Payer: COMMERCIAL

## 2024-02-15 DIAGNOSIS — Z98.890 S/P RIGHT ROTATOR CUFF REPAIR: ICD-10-CM

## 2024-02-15 DIAGNOSIS — S46.011D TRAUMATIC COMPLETE TEAR OF RIGHT ROTATOR CUFF, SUBSEQUENT ENCOUNTER: Primary | ICD-10-CM

## 2024-02-15 PROCEDURE — 97110 THERAPEUTIC EXERCISES: CPT

## 2024-02-15 PROCEDURE — 97140 MANUAL THERAPY 1/> REGIONS: CPT

## 2024-02-15 NOTE — PROGRESS NOTES
Daily Note     Today's date: 2/15/2024  Patient name: Wander Lechuga  : 1965  MRN: [id number]  Referring provider: Meche Salcedo PA-C  Dx:   Encounter Diagnoses   Name Primary?    Traumatic complete tear of right rotator cuff, subsequent encounter Yes    S/P right rotator cuff repair                   Subjective: Pt reports improvements in shoulder pain. Able to sleep for most of the night without waking up with pain. 0/10 currently.       Objective: See treatment diary below      Assessment: Progressed pt's program per treatment diary and demonstrates good tolerance. Initiated week 4 activities per post op protocol. Continued progression of PROM present.       Plan: Continue with plan of care to decrease pain, improve mobility, strength, and function.          Precautions RC post op protocol       Manuals 2/15 1/30 2/1 2/6 2/8   PROM Right shoulder all directions Right shoulder flex, abd, ER Right shoulder flex, abd, ER Right shoulder flex, abd, ER Right shoulder flex, abd, ER   Mobilization   GHJ distraction gr II GHJ distraction gr II GHJ distraction gr II, ST retraction gr III GHJ distraction gr II, ST retraction gr III   STM     Medial scap muscles           Neuro Re-Ed         Scap retraction   Hold 5, 2x10   Hold 5, 2x10     Band retraction Green 2x10                                               Ther Ex        Elbow flex  x10              C/S ROM        Cane ER Hold 5, 2x10  Passive x15 Hold 5, 2x10  Hold 5, 2x10  Hold 5, 2x10    Self PROM flexion  x10 Hold 5, 2x10  Hold 5, 2x10  Hold 5, 2x10    pendulums  2 way x10 ea 2 way x20 ea 2 way x20 ea 2 way x20 ea   Table slides     Flex 2x10   Cane flexion AAROM Hold 5, 2x10        Cane abduction Standing Hold 5, 2x10        Ball roll out Hold 5, 2x10     Passive 2x10   Ther Activity                        Gait Training                        Modalities                             Insurance:  AMA/CMS Eval/ Re-eval Auth #/ Referral # Total units   Start date  Expiration date Extension  Visit limitation?  PT only or  PT+OT? Co-Insurance   CMS       BOMN                   POC Start Date POC Expiration Date Signed POC?   1/25/24 3/7/24       Date               Visits/Units:  Used               Authed:  Remaining

## 2024-02-20 ENCOUNTER — OFFICE VISIT (OUTPATIENT)
Dept: PHYSICAL THERAPY | Facility: CLINIC | Age: 59
End: 2024-02-20
Payer: COMMERCIAL

## 2024-02-20 DIAGNOSIS — S46.011D TRAUMATIC COMPLETE TEAR OF RIGHT ROTATOR CUFF, SUBSEQUENT ENCOUNTER: Primary | ICD-10-CM

## 2024-02-20 DIAGNOSIS — Z98.890 S/P RIGHT ROTATOR CUFF REPAIR: ICD-10-CM

## 2024-02-20 PROCEDURE — 97110 THERAPEUTIC EXERCISES: CPT

## 2024-02-20 PROCEDURE — 97140 MANUAL THERAPY 1/> REGIONS: CPT

## 2024-02-20 NOTE — PROGRESS NOTES
Daily Note     Today's date: 2024  Patient name: Wander Lechuga  : 1965  MRN: [id number]  Referring provider: Meche Salcedo PA-C  Dx:   Encounter Diagnoses   Name Primary?    Traumatic complete tear of right rotator cuff, subsequent encounter Yes    S/P right rotator cuff repair                   Subjective: Pt reports he feels he is progressing well with ROM. Performing exercises several times a day. Able to tolerate sleeping in bed now and waking up less frequently.       Objective: See treatment diary below      Assessment: Pt tolerated treatment well with no complaints of pain. Pt progressing well per post op rehab program. Progress with week 5 activities next visit.      Plan: Progress note next visit.          Precautions RC post op protocol       Manuals 2/15 2/20  2/6 2/8   PROM Right shoulder all directions Right shoulder all directions Right shoulder flex, abd, ER Right shoulder flex, abd, ER Right shoulder flex, abd, ER   Mobilization    GHJ distraction gr II GHJ distraction gr II, ST retraction gr III GHJ distraction gr II, ST retraction gr III   STM     Medial scap muscles           Neuro Re-Ed         Scap retraction     Hold 5, 2x10     Band retraction Green 2x10 Green 2x10                                              Ther Ex        Elbow flex                C/S ROM        Cane ER Hold 5, 2x10  Hold 5, 2x10 Hold 5, 2x10  Hold 5, 2x10  Hold 5, 2x10    Self PROM flexion   Hold 5, 2x10  Hold 5, 2x10  Hold 5, 2x10    pendulums   2 way x20 ea 2 way x20 ea 2 way x20 ea   Table slides     Flex 2x10   Cane flexion AAROM Hold 5, 2x10  AAROM Hold 5, 2x10       Cane abduction Standing Hold 5, 2x10  Standing Hold 5, 2x10       Ball roll out Hold 5, 2x10  Hold 5, 2x10    Passive 2x10   Ther Activity                        Gait Training                        Modalities                             Insurance:  AMA/CMS Eval/ Re-eval Auth #/ Referral # Total units  Start date  Expiration date  Extension  Visit limitation?  PT only or  PT+OT? Co-Insurance   CMS       BOMN                   POC Start Date POC Expiration Date Signed POC?   1/25/24 3/7/24       Date               Visits/Units:  Used               Authed:  Remaining

## 2024-02-21 PROBLEM — Z12.11 ENCOUNTER FOR SCREENING FOR MALIGNANT NEOPLASM OF COLON: Status: RESOLVED | Noted: 2019-10-11 | Resolved: 2024-02-21

## 2024-02-22 ENCOUNTER — EVALUATION (OUTPATIENT)
Dept: PHYSICAL THERAPY | Facility: CLINIC | Age: 59
End: 2024-02-22
Payer: COMMERCIAL

## 2024-02-22 DIAGNOSIS — Z98.890 S/P RIGHT ROTATOR CUFF REPAIR: ICD-10-CM

## 2024-02-22 DIAGNOSIS — S46.011D TRAUMATIC COMPLETE TEAR OF RIGHT ROTATOR CUFF, SUBSEQUENT ENCOUNTER: Primary | ICD-10-CM

## 2024-02-22 PROCEDURE — 97140 MANUAL THERAPY 1/> REGIONS: CPT

## 2024-02-22 PROCEDURE — 97112 NEUROMUSCULAR REEDUCATION: CPT

## 2024-02-22 PROCEDURE — 97110 THERAPEUTIC EXERCISES: CPT

## 2024-02-22 NOTE — PROGRESS NOTES
PT Re-Evaluation     Today's date: 2024  Patient name: Wander Lechuga  : 1965  MRN: [id number]  Referring provider: Meche Salcedo PA-C  Dx:   Encounter Diagnosis     ICD-10-CM    1. Traumatic complete tear of right rotator cuff, subsequent encounter  S46.011D       2. S/P right rotator cuff repair  Z98.890                      Assessment  Assessment details: Wander Lechuga is a 58 y.o. male who has been seen in physical therapy for 8 visits secondary to the diagnosis of Traumatic complete tear of right rotator cuff, subsequent encounter  (primary encounter diagnosis), S/P right rotator cuff repair and is making steady gains towards established goals. The patient has demonstrated decreased pain level, improved strength upper extremity, improved postural awareness, and improved upper extremity ROM. As a result, he has been able to increase functional activities such as self care and performing work duties. He would benefit from continued PT services to address remaining impairments outlined in Progress Note and to maximize function.     Impairments: abnormal muscle firing, abnormal or restricted ROM, abnormal movement, activity intolerance, impaired physical strength, lacks appropriate home exercise program, pain with function and scapular dyskinesis    Symptom irritability: lowUnderstanding of Dx/Px/POC: good   Prognosis: good    Goals  Short Term Goals (6-8 weeks)  Pt will be independent in basic Home Exercise Program -met  Pt will demonstrate improved right shoulder PROM by 25 degrees in all directions-met  Pt will be able to sleep through night without waking up from pain-met  Pt will be able to initiate AROM of shoulder per post op protocol-met    Long Term Goals (10-12+ weeks) ONGOING  Pt will be independent in comprehensive Home Exercise Program  Pt will be able to perform all ADLs with pain no more than 2/10  Pt will demonstrate improved shoulder MMT strength to at least 4+/5  Pt will  demonstrate improved FOTO status score by 10 points  Update goals dependent on patient progression       Plan  Patient would benefit from: skilled PT  Referral necessary: No  Planned modality interventions: cryotherapy and thermotherapy: hydrocollator packs  Planned therapy interventions: ADL retraining, body mechanics training, functional ROM exercises, home exercise program, graded exercise, joint mobilization, manual therapy, massage, neuromuscular re-education, patient education, postural training, strengthening, stretching, therapeutic activities, therapeutic exercise and therapeutic training  Frequency: 2x week  Duration in weeks: 6  Treatment plan discussed with: patient        Subjective Evaluation    History of Present Illness  Date of surgery: 2024  Mechanism of injury: surgery  Mechanism of injury: Pt reports he feels like he's at 70% of his function in shoulder. He has been compliant with sling use and post op precautions. Independent with all self care activities and is able to perform work duties on the computer with no difficulties. Continues to avoid and lifting or push/pulling of shoulder. Denies any sleep disturbances at this time and is tolerating sleeping in bed now.   Patient Goals  Patient goals for therapy: decreased pain, increased motion, increased strength, independence with ADLs/IADLs and return to sport/leisure activities (ongoing)    Pain  Current pain ratin  At best pain ratin  At worst pain ratin  Location: right shoulder  Quality: dull ache  Relieving factors: ice, medications and change in position  Aggravating factors: overhead activity  Progression: improved    Social Support    Employment status: working (desk-IT)  Exercise history: cardio, walking, house work      Diagnostic Tests  MRI studies: abnormal  Treatments  Current treatment: immobilization and medication        Objective     Postural Observations  Seated posture: good  Standing posture:  good      Observations     Right Shoulder  Positive for incision.     Additional Observation Details  Right shoulder sling in use    Cervical/Thoracic Screen   Cervical range of motion within normal limits    Neurological Testing     Sensation     Shoulder   Left Shoulder   Intact: light touch    Right Shoulder   Intact: Light touch    Active Range of Motion   Left Shoulder   Flexion: 175 degrees   Abduction: 180 degrees   External rotation 45°: 75 degrees   Internal rotation 45°: 70 degrees     Additional Active Range of Motion Details  AROM of right shoulder deferred secondary to post op precaution  Right elbow and wrist WNL    Passive Range of Motion     Right Shoulder   Flexion: 140 degrees with pain  Abduction: 110 degrees with pain  External rotation 45°: 65 degrees with pain  Internal rotation 45°: 50 degrees     Strength/Myotome Testing     Left Shoulder     Planes of Motion   Flexion: 5   Abduction: 5   External rotation at 0°: 5   Internal rotation at 0°: 5     Right Shoulder     Planes of Motion   Flexion: 3   Abduction: 3   External rotation at 0°: 3   Internal rotation at 0°: 3     Additional Strength Details  Resisted motions deferred secondary to post op precautions      Flowsheet Rows      Flowsheet Row Most Recent Value   PT/OT G-Codes    Current Score 60   Projected Score 73   FOTO information reviewed Yes                   Precautions RC post op protocol  HEP [employer] access code: [email or web address]    Manuals 2/15 2/20 2/22  2/8   PROM Right shoulder all directions Right shoulder all directions Right shoulder flex, abd, ER Right shoulder flex, abd, ER Right shoulder flex, abd, ER   Mobilization    GHJ distraction gr II GHJ distraction gr II, ST retraction gr III GHJ distraction gr II, ST retraction gr III   STM     Medial scap muscles           Neuro Re-Ed         Scap retraction     Hold 5, 2x10     Band retraction Green 2x10 Green 2x10 Green 2x10     BL ER   Standing with scap retraction 2x10                                      Ther Ex        Elbow flex   3 lb 4x10             C/S ROM        Cane ER Hold 5, 2x10  Hold 5, 2x10 Hold 5, 2x10  Hold 5, 2x10  Hold 5, 2x10    Self PROM flexion    Hold 5, 2x10  Hold 5, 2x10    pendulums    2 way x20 ea 2 way x20 ea   Table slides     Flex 2x10   Cane flexion AAROM Hold 5, 2x10  AAROM Hold 5, 2x10  Table 45 deg 2x10     Cane abduction Standing Hold 5, 2x10  Standing Hold 5, 2x10  Standing Hold 5, 2x10      Ball roll out Hold 5, 2x10  Hold 5, 2x10  Hold 5, 2x10   Passive 2x10   Ther Activity                        Gait Training                        Modalities                             Insurance:  AMA/CMS Eval/ Re-eval Auth #/ Referral # Total units  Start date  Expiration date Extension  Visit limitation?  PT only or  PT+OT? Co-Insurance   CMS       BOMN                   POC Start Date POC Expiration Date Signed POC?   1/25/24 3/7/24       Date               Visits/Units:  Used               Authed:  Remaining

## 2024-02-27 ENCOUNTER — OFFICE VISIT (OUTPATIENT)
Dept: PHYSICAL THERAPY | Facility: CLINIC | Age: 59
End: 2024-02-27
Payer: COMMERCIAL

## 2024-02-27 ENCOUNTER — OFFICE VISIT (OUTPATIENT)
Dept: OBGYN CLINIC | Facility: CLINIC | Age: 59
End: 2024-02-27

## 2024-02-27 VITALS
DIASTOLIC BLOOD PRESSURE: 88 MMHG | SYSTOLIC BLOOD PRESSURE: 137 MMHG | BODY MASS INDEX: 25.9 KG/M2 | WEIGHT: 185 LBS | HEIGHT: 71 IN | HEART RATE: 79 BPM

## 2024-02-27 DIAGNOSIS — Z98.890 S/P RIGHT ROTATOR CUFF REPAIR: Primary | ICD-10-CM

## 2024-02-27 DIAGNOSIS — S46.011D TRAUMATIC COMPLETE TEAR OF RIGHT ROTATOR CUFF, SUBSEQUENT ENCOUNTER: Primary | ICD-10-CM

## 2024-02-27 DIAGNOSIS — Z98.890 S/P RIGHT ROTATOR CUFF REPAIR: ICD-10-CM

## 2024-02-27 PROCEDURE — 99024 POSTOP FOLLOW-UP VISIT: CPT | Performed by: ORTHOPAEDIC SURGERY

## 2024-02-27 PROCEDURE — 97110 THERAPEUTIC EXERCISES: CPT

## 2024-02-27 PROCEDURE — 97112 NEUROMUSCULAR REEDUCATION: CPT

## 2024-02-27 PROCEDURE — 97140 MANUAL THERAPY 1/> REGIONS: CPT

## 2024-02-27 NOTE — PROGRESS NOTES
Daily Note     Today's date: 2024  Patient name: Wander Lechuga  : 1965  MRN: [id number]  Referring provider: Meche Salcedo PA-C  Dx:   Encounter Diagnoses   Name Primary?    Traumatic complete tear of right rotator cuff, subsequent encounter Yes    S/P right rotator cuff repair                   Subjective: Pt reports he had a follow up with Dr Irwin and was cleared to wean off sling. No pain currently.       Objective: See treatment diary below      Assessment: Pt tolerated treatment well with no complaints of pain. Discussed weaning off sling and sleeping positions for comfort as well has post op precautions the next couple of weeks.       Plan: Continue with plan of care to decrease pain, improve mobility, strength, and function.          Precautions RC post op protocol  HEP [employer] access code: [email or web address]    Manuals 2/15 2/20 2/22 2/27    PROM Right shoulder all directions Right shoulder all directions Right shoulder flex, abd, ER Right shoulder flex, abd, ER Right shoulder flex, abd, ER   Mobilization    GHJ distraction gr II GHJ distraction gr II GHJ distraction gr II, ST retraction gr III   STM     Medial scap muscles           Neuro Re-Ed         Scap retraction         Band retraction Green 2x10 Green 2x10 Green 2x10 Green 2x10    BL ER   Standing with scap retraction 2x10                                     Ther Ex        Elbow flex   3 lb 4x10 3 lb 4x10            C/S ROM        Cane ER Hold 5, 2x10  Hold 5, 2x10 Hold 5, 2x10  Hold 5, 2x10  Hold 5, 2x10    Self PROM flexion     Hold 5, 2x10    pendulums     2 way x20 ea   Table slides     Flex 2x10   Cane flexion AAROM Hold 5, 2x10  AAROM Hold 5, 2x10  Table 45 deg 2x10 AAROM Hold 5, 2x10     Cane abduction Standing Hold 5, 2x10  Standing Hold 5, 2x10  Standing Hold 5, 2x10  Standing Hold 5, 2x10     Ball roll out Hold 5, 2x10  Hold 5, 2x10  Hold 5, 2x10  Hold 5, 2x10  Passive 2x10   Ther Activity                        Gait  Training                        Modalities                             Insurance:  A/CMS Eval/ Re-eval Auth #/ Referral # Total units  Start date  Expiration date Extension  Visit limitation?  PT only or  PT+OT? Co-Insurance   CMS       BOMN                   POC Start Date POC Expiration Date Signed POC?   1/25/24 3/7/24       Date               Visits/Units:  Used               Authed:  Remaining

## 2024-02-27 NOTE — PROGRESS NOTES
SUBJECTIVE:  Patient is a 58 y.o. year old male who presents for follow up now 6 weeks s/p  Right shoulder arthroscopy, rotator cuff repair - Right performed on  1/17/2024.  Today patient has well-controlled pain.  His range of motion is continue to normalize.  He has been compliant with sling wear.  He has no significant pain today.  He denies any numbness or tingling.          VITALS:  Vitals:    02/27/24 0951   BP: 137/88   Pulse: 79       PHYSICAL EXAMINATION:  General: well developed and well nourished, alert, oriented times 3, and appears comfortable  Psychiatric: Normal    MUSCULOSKELETAL EXAMINATION:    Incision:   Well-healed  Range of Motion: Active forward flexion 120, active external rotation 45, active internal rotation L4  5 out of 5 internal rotation, 4+ out of 5 empty can and external rotation  Neurovascular status: Positive axillary/AIN/PIN/ulnar motor  Sensation intact to light touch  Palpable radial pulse      PLAN:    Patient continues to do very well since surgery.  He is made excellent progress in terms of motion and strength already.  I cautioned him against doing too much too soon.  He may start to wean out of his sling at this point.  He should still avoid lifting any amount of weight that he has not already lifted with his physical therapist.  He may continue to progress along his protocol but will not start significant strengthening till about 12 weeks.    Continue physical therapy per provided protocol.

## 2024-02-29 ENCOUNTER — OFFICE VISIT (OUTPATIENT)
Dept: PHYSICAL THERAPY | Facility: CLINIC | Age: 59
End: 2024-02-29
Payer: COMMERCIAL

## 2024-02-29 DIAGNOSIS — S46.011D TRAUMATIC COMPLETE TEAR OF RIGHT ROTATOR CUFF, SUBSEQUENT ENCOUNTER: Primary | ICD-10-CM

## 2024-02-29 DIAGNOSIS — Z98.890 S/P RIGHT ROTATOR CUFF REPAIR: ICD-10-CM

## 2024-02-29 PROCEDURE — 97140 MANUAL THERAPY 1/> REGIONS: CPT

## 2024-02-29 PROCEDURE — 97112 NEUROMUSCULAR REEDUCATION: CPT

## 2024-02-29 PROCEDURE — 97110 THERAPEUTIC EXERCISES: CPT

## 2024-02-29 NOTE — PROGRESS NOTES
Daily Note     Today's date: 2024  Patient name: Wander Lechuga  : 1965  MRN: [id number]  Referring provider: Meche Salcedo PA-C  Dx:   Encounter Diagnoses   Name Primary?    Traumatic complete tear of right rotator cuff, subsequent encounter Yes    S/P right rotator cuff repair                   Subjective: Pt reports he has been weaning off sling. Sleeping better throughout the night. No pain currently.       Objective: See treatment diary below      Assessment: Pt tolerated treatment well with minimal complaints of pain. Pt continues to demonstrated improved PROM of shoulder. Cueing provided to minimize scap hiking with elevation.       Plan: Continue with plan of care to decrease pain, improve mobility, strength, and function.          Precautions RC post op protocol  HEP [employer] access code: [email or web address]    Manuals 2/15 2/20 2/22 2/27 2/29   PROM Right shoulder all directions Right shoulder all directions Right shoulder flex, abd, ER Right shoulder flex, abd, ER Right shoulder flex, abd, ER   Mobilization    GHJ distraction gr II GHJ distraction gr II GHJ distraction gr   STM                Neuro Re-Ed         Scap retraction         Band retraction Green 2x10 Green 2x10 Green 2x10 Green 2x10 Green 2x10   BL ER   Standing with scap retraction 2x10  Standing with scap retraction 2x10   Bane ext     30-0 degrees 2x10 blue                           Ther Ex        Elbow flex   3 lb 4x10 3 lb 4x10 3 lb 3x10   Pulleys flex passive     Hold 5, 2x10    C/S ROM        Cane ER Hold 5, 2x10  Hold 5, 2x10 Hold 5, 2x10  Hold 5, 2x10  Hold 5, 2x10    Self PROM flexion        pendulums        Table slides        Cane flexion AAROM Hold 5, 2x10  AAROM Hold 5, 2x10  Table 45 deg 2x10 AAROM Hold 5, 2x10  AAROM Hold 5, 2x10 reclined   Cane abduction Standing Hold 5, 2x10  Standing Hold 5, 2x10  Standing Hold 5, 2x10  Standing Hold 5, 2x10  Standing Hold 5, 2x10    Ball roll out Hold 5, 2x10  Hold 5, 2x10  Hold 5,  2x10  Hold 5, 2x10  Hold 5, 2x10    Ther Activity                        Gait Training                        Modalities                             Insurance:  AMA/CMS Eval/ Re-eval Auth #/ Referral # Total units  Start date  Expiration date Extension  Visit limitation?  PT only or  PT+OT? Co-Insurance   CMS       BOMN                   POC Start Date POC Expiration Date Signed POC?   1/25/24 3/7/24       Date               Visits/Units:  Used               Authed:  Remaining

## 2024-03-05 ENCOUNTER — OFFICE VISIT (OUTPATIENT)
Dept: PHYSICAL THERAPY | Facility: CLINIC | Age: 59
End: 2024-03-05
Payer: COMMERCIAL

## 2024-03-05 DIAGNOSIS — Z98.890 S/P RIGHT ROTATOR CUFF REPAIR: ICD-10-CM

## 2024-03-05 DIAGNOSIS — S46.011D TRAUMATIC COMPLETE TEAR OF RIGHT ROTATOR CUFF, SUBSEQUENT ENCOUNTER: Primary | ICD-10-CM

## 2024-03-05 PROCEDURE — 97110 THERAPEUTIC EXERCISES: CPT

## 2024-03-05 PROCEDURE — 97140 MANUAL THERAPY 1/> REGIONS: CPT

## 2024-03-05 PROCEDURE — 97112 NEUROMUSCULAR REEDUCATION: CPT

## 2024-03-05 NOTE — PROGRESS NOTES
Daily Note     Today's date: 3/5/2024  Patient name: Wander Lechuga  : 1965  MRN: [id number]  Referring provider: Meche Salcedo PA-C  Dx:   Encounter Diagnoses   Name Primary?    Traumatic complete tear of right rotator cuff, subsequent encounter Yes    S/P right rotator cuff repair                   Subjective: Pt reports no new c/o. Sleeping better at night.       Objective: See treatment diary below      Assessment: Pt tolerated treatment well with no complaints of pain. Pt demonstrating near full AAROM of shoulder in all directions. Fatigued at end of session.       Plan: Continue with plan of care to decrease pain, improve mobility, strength, and function.          Precautions RC post op protocol  HEP [employer] access code: [email or web address]    Manuals 3/5 2/20 2/22 2/27 2/29   PROM Right shoulder all directions Right shoulder all directions Right shoulder flex, abd, ER Right shoulder flex, abd, ER Right shoulder flex, abd, ER   Mobilization  GHJ distraction gr II  GHJ distraction gr II GHJ distraction gr II GHJ distraction gr   STM                Neuro Re-Ed         Scap retraction         Band retraction Green 2x10 Green 2x10 Green 2x10 Green 2x10 Green 2x10   BL ER Standing with scap retraction 2x10  Standing with scap retraction 2x10  Standing with scap retraction 2x10   Band ext 30-0 degrees 2x10 blue    30-0 degrees 2x10 blue   Supine alphabet At 90 x2                        Ther Ex        Elbow flex 3 lb 3x10  3 lb 4x10 3 lb 4x10 3 lb 3x10   Pulleys flex passive Hold 5, 2x10     Hold 5, 2x10    C/S ROM        Cane ER Hold 5, 2x10  Hold 5, 2x10 Hold 5, 2x10  Hold 5, 2x10  Hold 5, 2x10    Self PROM flexion        pendulums        Tricep pull down Blue 2x10       Cane flexion AAROM Hold 5, 2x10  AAROM Hold 5, 2x10  Table 45 deg 2x10 AAROM Hold 5, 2x10  AAROM Hold 5, 2x10 reclined   Cane abduction Standing Hold 5, 2x10  Standing Hold 5, 2x10  Standing Hold 5, 2x10  Standing Hold 5, 2x10  Standing Hold 5,  2x10    Ball roll out Hold 5, 2x10  Hold 5, 2x10  Hold 5, 2x10  Hold 5, 2x10  Hold 5, 2x10    Ther Activity                        Gait Training                        Modalities                             Insurance:  AMA/CMS Eval/ Re-eval Auth #/ Referral # Total units  Start date  Expiration date Extension  Visit limitation?  PT only or  PT+OT? Co-Insurance   CMS       BOMN                   POC Start Date POC Expiration Date Signed POC?   1/25/24 3/7/24       Date               Visits/Units:  Used               Authed:  Remaining

## 2024-03-07 ENCOUNTER — OFFICE VISIT (OUTPATIENT)
Dept: PHYSICAL THERAPY | Facility: CLINIC | Age: 59
End: 2024-03-07
Payer: COMMERCIAL

## 2024-03-07 DIAGNOSIS — S46.011D TRAUMATIC COMPLETE TEAR OF RIGHT ROTATOR CUFF, SUBSEQUENT ENCOUNTER: Primary | ICD-10-CM

## 2024-03-07 DIAGNOSIS — Z98.890 S/P RIGHT ROTATOR CUFF REPAIR: ICD-10-CM

## 2024-03-07 PROCEDURE — 97112 NEUROMUSCULAR REEDUCATION: CPT

## 2024-03-07 PROCEDURE — 97110 THERAPEUTIC EXERCISES: CPT

## 2024-03-07 PROCEDURE — 97140 MANUAL THERAPY 1/> REGIONS: CPT

## 2024-03-07 NOTE — PROGRESS NOTES
Daily Note     Today's date: 3/7/2024  Patient name: Wander Lechuga  : 1965  MRN: [id number]  Referring provider: Meche Salcedo PA-C  Dx:   Encounter Diagnoses   Name Primary?    Traumatic complete tear of right rotator cuff, subsequent encounter Yes    S/P right rotator cuff repair                   Subjective: Pt reports no new c/o. Soreness present when waking up.       Objective: See treatment diary below      Assessment: Pt tolerated treatment well with no complaints of pain. Pt demonstrating full ROM in shoulder all directions with minimal to no discomfort. POC discussed with pt and in agreement to decrease frequency of therapy to once per week secondary to high copay until week 12 when strengthening can be initiated. Pt independent with HEP.       Plan: Continue with plan of care to decrease pain, improve mobility, strength, and function.          Precautions RC post op protocol  HEP [employer] access code: [email or web address]    Manuals 3/5 3/7 2/22 2/27 2/29   PROM Right shoulder all directions Right shoulder all directions Right shoulder flex, abd, ER Right shoulder flex, abd, ER Right shoulder flex, abd, ER   Mobilization  GHJ distraction gr II GHJ distraction and post gr III GHJ distraction gr II GHJ distraction gr II GHJ distraction gr   STM                Neuro Re-Ed         Scap retraction         Band retraction Green 2x10 Green 2x10 Green 2x10 Green 2x10 Green 2x10   BL ER Standing with scap retraction 2x10 Standing with scap retraction 2x10 Standing with scap retraction 2x10  Standing with scap retraction 2x10   Band ext 30-0 degrees 2x10 blue 30-0 degrees 2x10 yellow 30-0 degrees 2x10 yellow  30-0 degrees 2x10 blue   Supine alphabet At 90 x2  At 90 x2  At 90 x2                      Ther Ex        Elbow flex 3 lb 3x10 4 lb 3x10  3 lb 4x10 3 lb 3x10   Pulleys flex passive Hold 5, 2x10  Hold 5, 2x10    Hold 5, 2x10    C/S ROM        Cane ER Hold 5, 2x10  Hold 5, 2x10 Hold 5, 2x10  Hold 5, 2x10  Hold  5, 2x10    Prone T  Hold 5, 2x10       pendulums        Tricep pull down Blue 2x10 Blue 2x10      Cane flexion AAROM Hold 5, 2x10  AAROM Hold 5, 2x10  AAROM Hold 5, 2x10 reclined AAROM Hold 5, 2x10  AAROM Hold 5, 2x10 reclined   Cane abduction Standing Hold 5, 2x10  Standing Hold 5, 2x10  Standing Hold 5, 2x10  Standing Hold 5, 2x10  Standing Hold 5, 2x10    Ball roll out Hold 5, 2x10  Hold 5, 2x10  Hold 5, 2x10  Hold 5, 2x10  Hold 5, 2x10    Ther Activity                        Gait Training                        Modalities                             Insurance:  AMA/CMS Eval/ Re-eval Auth #/ Referral # Total units  Start date  Expiration date Extension  Visit limitation?  PT only or  PT+OT? Co-Insurance   CMS       BOMN                   POC Start Date POC Expiration Date Signed POC?   1/25/24 3/7/24       Date               Visits/Units:  Used               Authed:  Remaining

## 2024-03-08 DIAGNOSIS — J45.40 MODERATE PERSISTENT ASTHMA WITHOUT COMPLICATION: ICD-10-CM

## 2024-03-11 RX ORDER — FLUTICASONE FUROATE AND VILANTEROL 100; 25 UG/1; UG/1
1 POWDER RESPIRATORY (INHALATION) DAILY
Qty: 180 BLISTER | Refills: 0 | Status: SHIPPED | OUTPATIENT
Start: 2024-03-11 | End: 2024-06-09

## 2024-03-12 ENCOUNTER — APPOINTMENT (OUTPATIENT)
Dept: PHYSICAL THERAPY | Facility: CLINIC | Age: 59
End: 2024-03-12
Payer: COMMERCIAL

## 2024-03-14 ENCOUNTER — OFFICE VISIT (OUTPATIENT)
Dept: PHYSICAL THERAPY | Facility: CLINIC | Age: 59
End: 2024-03-14
Payer: COMMERCIAL

## 2024-03-14 DIAGNOSIS — S46.011D TRAUMATIC COMPLETE TEAR OF RIGHT ROTATOR CUFF, SUBSEQUENT ENCOUNTER: Primary | ICD-10-CM

## 2024-03-14 DIAGNOSIS — Z98.890 S/P RIGHT ROTATOR CUFF REPAIR: ICD-10-CM

## 2024-03-14 PROCEDURE — 97112 NEUROMUSCULAR REEDUCATION: CPT

## 2024-03-14 PROCEDURE — 97140 MANUAL THERAPY 1/> REGIONS: CPT

## 2024-03-14 PROCEDURE — 97110 THERAPEUTIC EXERCISES: CPT

## 2024-03-14 NOTE — PROGRESS NOTES
Daily Note     Today's date: 3/14/2024  Patient name: Wander Lechuga  : 1965  MRN: [id number]  Referring provider: Meche Salcedo PA-C  Dx:   Encounter Diagnoses   Name Primary?    Traumatic complete tear of right rotator cuff, subsequent encounter Yes    S/P right rotator cuff repair                   Subjective: Pt reports his shoulder has been feeling great and is sleeping better. 0/10 pain.       Objective: See treatment diary below      Assessment: Progressed pt's program per treatment diary and demonstrates good tolerance. Initiated AROM of shoulder per week 8 of protocol. Cueing provided to minimize scap hiking. Pt's HEP was updated on handsomexcutive code and demonstrates understanding.      Plan: Continue with plan of care to decrease pain, improve mobility, strength, and function.          Precautions RC post op protocol  HEP Mobile Pulse access code: [email or web address]    Manuals 3/5 3/7 3/14 2/27 2/29   PROM Right shoulder all directions Right shoulder all directions Right shoulder flex, abd, ER Right shoulder flex, abd, ER Right shoulder flex, abd, ER   Mobilization  GHJ distraction gr II GHJ distraction and post gr III GHJ distraction gr II GHJ distraction gr II GHJ distraction gr   STM                Neuro Re-Ed         Scap retraction         Band retraction Green 2x10 Green 2x10  Green 2x10 Green 2x10   BL ER Standing with scap retraction 2x10 Standing with scap retraction 2x10   Standing with scap retraction 2x10   Band ext 30-0 degrees 2x10 blue 30-0 degrees 2x10 yellow   30-0 degrees 2x10 blue   Supine alphabet At 90 x2  At 90 x2  At 90 x2      RC iso   3 way Hold 5, 2x10 ea     ER iso step out   Hold 5, x15 green     Standing scaption   2x10     Standing abd   Hold 5, 2x10      Ther Ex        Elbow flex 3 lb 3x10 4 lb 3x10  3 lb 4x10 3 lb 3x10   Pulleys flex passive Hold 5, 2x10  Hold 5, 2x10    Hold 5, 2x10    C/S ROM        Cane ER Hold 5, 2x10  Hold 5, 2x10  Hold 5, 2x10  Hold 5, 2x10    Prone T   Hold 5, 2x10  Hold 5, 2x10      Prone Y   Hold 5, 2x10      Prone ext   Hold 5, 2x10      Tricep pull down Blue 2x10 Blue 2x10      Cane flexion AAROM Hold 5, 2x10  AAROM Hold 5, 2x10  Supine flex Hold 5, 2x10  AAROM Hold 5, 2x10  AAROM Hold 5, 2x10 reclined   Cane abduction Standing Hold 5, 2x10  Standing Hold 5, 2x10   Standing Hold 5, 2x10  Standing Hold 5, 2x10    Ball roll out Hold 5, 2x10  Hold 5, 2x10   Hold 5, 2x10  Hold 5, 2x10    SL ER   Hold 5, 2x10      SL abd   2x10                                      Modalities                             Insurance:  AMA/CMS Eval/ Re-eval Auth #/ Referral # Total units  Start date  Expiration date Extension  Visit limitation?  PT only or  PT+OT? Co-Insurance   CMS       BOMN                   POC Start Date POC Expiration Date Signed POC?   1/25/24 3/7/24       Date               Visits/Units:  Used               Authed:  Remaining

## 2024-03-19 ENCOUNTER — APPOINTMENT (OUTPATIENT)
Dept: PHYSICAL THERAPY | Facility: CLINIC | Age: 59
End: 2024-03-19
Payer: COMMERCIAL

## 2024-03-21 ENCOUNTER — APPOINTMENT (OUTPATIENT)
Dept: PHYSICAL THERAPY | Facility: CLINIC | Age: 59
End: 2024-03-21
Payer: COMMERCIAL

## 2024-03-26 ENCOUNTER — OFFICE VISIT (OUTPATIENT)
Dept: PHYSICAL THERAPY | Facility: CLINIC | Age: 59
End: 2024-03-26
Payer: COMMERCIAL

## 2024-03-26 DIAGNOSIS — Z98.890 S/P RIGHT ROTATOR CUFF REPAIR: ICD-10-CM

## 2024-03-26 DIAGNOSIS — S46.011D TRAUMATIC COMPLETE TEAR OF RIGHT ROTATOR CUFF, SUBSEQUENT ENCOUNTER: Primary | ICD-10-CM

## 2024-03-26 PROCEDURE — 97112 NEUROMUSCULAR REEDUCATION: CPT

## 2024-03-26 PROCEDURE — 97110 THERAPEUTIC EXERCISES: CPT

## 2024-03-26 NOTE — PROGRESS NOTES
PT Re-Evaluation     Today's date: 3/26/2024  Patient name: Wander Lechuga  : 1965  MRN: [id number]  Referring provider: Meche Salcedo PA-C  Dx:   Encounter Diagnosis     ICD-10-CM    1. Traumatic complete tear of right rotator cuff, subsequent encounter  S46.011D       2. S/P right rotator cuff repair  Z98.890                      Assessment  Assessment details: Wander Lechuga is a 58 y.o. male who has been seen in physical therapy for 14 visits secondary to the diagnosis of Traumatic complete tear of right rotator cuff, subsequent encounter  (primary encounter diagnosis)  S/P right rotator cuff repair and is making steady gains towards established goals. The patient has demonstrated decreased pain level, improved strength upper extremity, improved postural awareness, improved upper extremity ROM, improved flexibility, and improved scapular stabilization. As a result, he has been able to increase functional activities such as reaching and performing ADLs. Continues to avoid any lifting at this time secondary to post op precautions He would benefit from continued PT services to address remaining impairments outlined in Progress Note and to maximize function.        Impairments: abnormal muscle firing, abnormal or restricted ROM, abnormal movement, activity intolerance, impaired physical strength, lacks appropriate home exercise program, pain with function and scapular dyskinesis    Symptom irritability: lowUnderstanding of Dx/Px/POC: good   Prognosis: good    Goals  Short Term Goals (6-8 weeks)  Pt will be independent in basic Home Exercise Program -met  Pt will demonstrate improved right shoulder PROM by 25 degrees in all directions-met  Pt will be able to sleep through night without waking up from pain-met  Pt will be able to initiate AROM of shoulder per post op protocol-met    Long Term Goals (10-12+ weeks) ONGOING  Pt will be independent in comprehensive Home Exercise Program  Pt will be able to  perform all ADLs with pain no more than 2/10  Pt will demonstrate improved shoulder MMT strength to at least 4+/5  Pt will demonstrate improved FOTO status score by 10 points -met  Update goals dependent on patient progression       Plan  Patient would benefit from: skilled PT  Referral necessary: No  Planned modality interventions: cryotherapy and thermotherapy: hydrocollator packs  Planned therapy interventions: ADL retraining, body mechanics training, functional ROM exercises, home exercise program, graded exercise, joint mobilization, manual therapy, massage, neuromuscular re-education, patient education, postural training, strengthening, stretching, therapeutic activities, therapeutic exercise and therapeutic training  Frequency: 1-2x/week.  Duration in weeks: 6  Treatment plan discussed with: patient        Subjective Evaluation    History of Present Illness  Date of surgery: 2024  Mechanism of injury: surgery  Mechanism of injury: Pt reports he feels like he's at 80% of his function in shoulder. States he has been compliant with all exercises at home and continuing to maintain post op precautions of no lifting at this time. Reports he has no pain in shoulder throughout the day and denies any sleep disturbances.   Patient Goals  Patient goals for therapy: decreased pain, increased motion, increased strength, independence with ADLs/IADLs and return to sport/leisure activities (ongoing)    Pain  Current pain ratin  At best pain ratin  At worst pain ratin  Location: right shoulder  Quality: tight  Aggravating factors: lifting  Progression: improved    Social Support    Employment status: working (desk-IT)  Exercise history: cardio, walking, house work      Diagnostic Tests  MRI studies: abnormal  Treatments  Current treatment: physical therapy        Objective     Postural Observations  Seated posture: good  Standing posture: good      Cervical/Thoracic Screen   Cervical range of motion within  normal limits    Neurological Testing     Sensation     Shoulder   Left Shoulder   Intact: light touch    Right Shoulder   Intact: Light touch    Active Range of Motion   Left Shoulder   Flexion: 175 degrees   Abduction: 180 degrees   External rotation 45°: 75 degrees   Internal rotation 45°: 70 degrees     Right Shoulder   Flexion: 172 degrees   Abduction: 175 degrees   External rotation 45°: 90 degrees   Internal rotation 45°: 35 degrees     Scapular Mobility     Right Shoulder   Scapular mobility: WFL    Strength/Myotome Testing     Left Shoulder     Planes of Motion   Flexion: 5   Abduction: 5   External rotation at 0°: 5   Internal rotation at 0°: 5     Right Shoulder     Planes of Motion   Flexion: 3+   Abduction: 3+   External rotation at 0°: 4-   Internal rotation at 0°: 4       Flowsheet Rows      Flowsheet Row Most Recent Value   PT/OT G-Codes    Current Score 69   Projected Score 73   FOTO information reviewed Yes                     Precautions RC post op protocol  HEP [employer]Bridge access code: [email or web address]    Manuals 3/5 3/7 3/14 3/26    PROM Right shoulder all directions Right shoulder all directions Right shoulder flex, abd, ER  Right shoulder flex, abd, ER   Mobilization  GHJ distraction gr II GHJ distraction and post gr III GHJ distraction gr II  GHJ distraction gr   STM                Neuro Re-Ed         Scap retraction         Band retraction Green 2x10 Green 2x10  blue 2x10 Green 2x10   BL ER Standing with scap retraction 2x10 Standing with scap retraction 2x10   Standing with scap retraction 2x10   Band ext 30-0 degrees 2x10 blue 30-0 degrees 2x10 yellow  30-0 degrees 2x10 blue 30-0 degrees 2x10 blue   Supine alphabet At 90 x2  At 90 x2  At 90 x2  At 90 x2 1lb    RC iso   3 way Hold 5, 2x10 ea 3 way Hold 5, 2x10 ea    ER iso step out   Hold 5, x15 green Hold 5, 20 green    Standing scaption   2x10     Standing abd   Hold 5, 2x10  Hold 5, 2x10     Ther Ex        Elbow flex 3 lb 3x10 4 lb 3x10   3 lb  3x10   Pulleys flex passive Hold 5, 2x10  Hold 5, 2x10    Hold 5, 2x10    C/S ROM        Cane ER Hold 5, 2x10  Hold 5, 2x10  Hold 5, 2x10 at 90 Hold 5, 2x10    Prone T  Hold 5, 2x10  Hold 5, 2x10  Hold 5, 2x10     Prone Y   Hold 5, 2x10  Hold 5, 2x10     Prone ext   Hold 5, 2x10  Hold 5, 2x10     Tricep pull down Blue 2x10 Blue 2x10      Cane flexion AAROM Hold 5, 2x10  AAROM Hold 5, 2x10  Supine flex Hold 5, 2x10  Hold 5, 2x10  AAROM Hold 5, 2x10 reclined   Cane abduction Standing Hold 5, 2x10  Standing Hold 5, 2x10    Standing Hold 5, 2x10    Ball roll out Hold 5, 2x10  Hold 5, 2x10    Hold 5, 2x10    SL ER   Hold 5, 2x10      SL abd   2x10      IR SOS    Hold 10, 1x10     Corner stretch    Hold 10, 1x10     Sleeper stretch    Hold 10, 1x10             Modalities                             Insurance:  AMA/CMS Eval/ Re-eval Auth #/ Referral # Total units  Start date  Expiration date Extension  Visit limitation?  PT only or  PT+OT? Co-Insurance   CMS       BOMN                   POC Start Date POC Expiration Date Signed POC?   1/25/24 3/7/24       Date               Visits/Units:  Used               Authed:  Remaining

## 2024-03-28 ENCOUNTER — APPOINTMENT (OUTPATIENT)
Dept: PHYSICAL THERAPY | Facility: CLINIC | Age: 59
End: 2024-03-28
Payer: COMMERCIAL

## 2024-04-02 ENCOUNTER — APPOINTMENT (OUTPATIENT)
Dept: PHYSICAL THERAPY | Facility: CLINIC | Age: 59
End: 2024-04-02
Payer: COMMERCIAL

## 2024-04-09 ENCOUNTER — OFFICE VISIT (OUTPATIENT)
Dept: PHYSICAL THERAPY | Facility: CLINIC | Age: 59
End: 2024-04-09
Payer: COMMERCIAL

## 2024-04-09 DIAGNOSIS — Z98.890 S/P RIGHT ROTATOR CUFF REPAIR: ICD-10-CM

## 2024-04-09 DIAGNOSIS — S46.011D TRAUMATIC COMPLETE TEAR OF RIGHT ROTATOR CUFF, SUBSEQUENT ENCOUNTER: Primary | ICD-10-CM

## 2024-04-09 PROCEDURE — 97112 NEUROMUSCULAR REEDUCATION: CPT

## 2024-04-09 PROCEDURE — 97140 MANUAL THERAPY 1/> REGIONS: CPT

## 2024-04-09 PROCEDURE — 97110 THERAPEUTIC EXERCISES: CPT

## 2024-04-09 NOTE — PROGRESS NOTES
Daily Note     Today's date: 2024  Patient name: Wander Lechuga  : 1965  MRN: [id number]  Referring provider: Meche Salcedo PA-C  Dx:   Encounter Diagnoses   Name Primary?    Traumatic complete tear of right rotator cuff, subsequent encounter Yes    S/P right rotator cuff repair                   Subjective: Pt reports he has been doing well and has minimal to no pain in shoulder unless stressing it.       Objective: See treatment diary below      Assessment: Progressed pt's program per treatment diary and demonstrates good tolerance. Initiated resisted exercises today per post op protocol. Denies any pain throughout. Discussed HEP structure and expectations in regards to soreness and rest. Pt's HEP was updated on Medbridge code and demonstrates understanding.      Plan: Continue with plan of care to decrease pain, improve mobility, strength, and function.          Precautions RC post op protocol  HEP [employer] access code: [email or web address]    Manuals 3/5 3/7 3/14 3/26 4/9   PROM Right shoulder all directions Right shoulder all directions Right shoulder flex, abd, ER  Right shoulder flex, abd, ER   Mobilization  GHJ distraction gr II GHJ distraction and post gr III GHJ distraction gr II  GHJ post and lateral gr III   STM                Neuro Re-Ed         Scap retraction         Band retraction Green 2x10 Green 2x10  blue 2x10    BL ER Standing with scap retraction 2x10 Standing with scap retraction 2x10      Band ext 30-0 degrees 2x10 blue 30-0 degrees 2x10 yellow  30-0 degrees 2x10 blue    Supine alphabet At 90 x2  At 90 x2  At 90 x2  At 90 x2 1lb At 120 x2 1lb   RC iso   3 way Hold 5, 2x10 ea 3 way Hold 5, 2x10 ea    ER iso step out   Hold 5, x15 green Hold 5, 20 green    Standing scaption   2x10     Standing abd   Hold 5, 2x10  Hold 5, 2x10     Ther Ex        Elbow flex 3 lb 3x10 4 lb 3x10      Pulleys flex passive Hold 5, 2x10  Hold 5, 2x10    Hold 5, 2x10    IR/ER tband     Yellow 2x10    Cane ER Hold  5, 2x10  Hold 5, 2x10  Hold 5, 2x10 at 90    Prone T  Hold 5, 2x10  Hold 5, 2x10  Hold 5, 2x10  Hold 5, 2x10 1lb   Prone Y   Hold 5, 2x10  Hold 5, 2x10  Hold 5, 2x10 1lb   Prone ext   Hold 5, 2x10  Hold 5, 2x10  Hold 5, 2x10 1lb   Tricep pull down Blue 2x10 Blue 2x10      Cane flexion AAROM Hold 5, 2x10  AAROM Hold 5, 2x10  Supine flex Hold 5, 2x10  Hold 5, 2x10  AAROM Hold 5, 2x10 reclined   Cane abduction Standing Hold 5, 2x10  Standing Hold 5, 2x10    Standing Hold 5, 2x10    Ball roll out Hold 5, 2x10  Hold 5, 2x10    Hold 5, 2x10    SL ER   Hold 5, 2x10    1 lb 2x10   SL abd   2x10   1 lb 2x10   IR SOS    Hold 10, 1x10     Corner stretch    Hold 10, 1x10     Sleeper stretch    Hold 10, 1x10  Hold 10, 1x10    Post capsule stretch     Hold 10, 1x10    Modalities                             Insurance:  AMA/CMS Eval/ Re-eval Auth #/ Referral # Total units  Start date  Expiration date Extension  Visit limitation?  PT only or  PT+OT? Co-Insurance   CMS       BOMN                   POC Start Date POC Expiration Date Signed POC?   1/25/24 3/7/24       Date               Visits/Units:  Used               Authed:  Remaining

## 2024-04-16 ENCOUNTER — OFFICE VISIT (OUTPATIENT)
Dept: PHYSICAL THERAPY | Facility: CLINIC | Age: 59
End: 2024-04-16
Payer: COMMERCIAL

## 2024-04-16 DIAGNOSIS — Z98.890 S/P RIGHT ROTATOR CUFF REPAIR: ICD-10-CM

## 2024-04-16 DIAGNOSIS — S46.011D TRAUMATIC COMPLETE TEAR OF RIGHT ROTATOR CUFF, SUBSEQUENT ENCOUNTER: Primary | ICD-10-CM

## 2024-04-16 PROCEDURE — 97110 THERAPEUTIC EXERCISES: CPT

## 2024-04-16 PROCEDURE — 97140 MANUAL THERAPY 1/> REGIONS: CPT

## 2024-04-16 PROCEDURE — 97112 NEUROMUSCULAR REEDUCATION: CPT

## 2024-04-16 NOTE — PROGRESS NOTES
Daily Note     Today's date: 2024  Patient name: Wander Lechuga  : 1965  MRN: [id number]  Referring provider: Meche Salcedo PA-C  Dx:   Encounter Diagnoses   Name Primary?    Traumatic complete tear of right rotator cuff, subsequent encounter Yes    S/P right rotator cuff repair                   Subjective: Pt reports he has been able to use his shoulder more throughout the day. Avoiding anything that is painful. States he was a little sore after starting strengthening exercises.       Objective: See treatment diary below      Assessment: Pt tolerated treatment well with no complaints of pain. Reviewed progression of HEP with patient and demonstrates independence. Tolerating strengthening exercises with no c/o pain. Fatigued at end of session.       Plan: Continue with plan of care and progress as tolerated.          Precautions RC post op protocol  HEP [employer] access code: [email or web address]    Manuals 4/16 3/7 3/14 3/26 4/9   PROM Right shoulder all directions Right shoulder all directions Right shoulder flex, abd, ER  Right shoulder flex, abd, ER   Mobilization  GHJ post gr III GHJ distraction and post gr III GHJ distraction gr II  GHJ post and lateral gr III   STM                Neuro Re-Ed         Shoulder taps  Counter x20       Band retraction  Green 2x10  blue 2x10    BL ER  Standing with scap retraction 2x10      Band ext Melissa 4.0 3x10 30-0 degrees 2x10 yellow  30-0 degrees 2x10 blue    Supine alphabet At 120 x2 1lb At 90 x2  At 90 x2  At 90 x2 1lb At 120 x2 1lb   RC iso   3 way Hold 5, 2x10 ea 3 way Hold 5, 2x10 ea    ER iso step out   Hold 5, x15 green Hold 5, 20 green    Standing scaption   2x10     Standing abd   Hold 5, 2x10  Hold 5, 2x10     Ther Ex        Elbow flex  4 lb 3x10      Pulleys flex passive Hold 5, 2x10  Hold 5, 2x10    Hold 5, 2x10    IR/ER tband Green/red to fatigue ea    Yellow 2x10    Cane ER  Hold 5, 2x10  Hold 5, 2x10 at 90    Prone T Hold 5, 2x10 1lb Hold 5, 2x10  Hold  5, 2x10  Hold 5, 2x10  Hold 5, 2x10 1lb   Prone Y Hold 5, 2x10 1lb  Hold 5, 2x10  Hold 5, 2x10  Hold 5, 2x10 1lb   Prone ext   Hold 5, 2x10  Hold 5, 2x10  Hold 5, 2x10 1lb   Tricep pull down  Blue 2x10      Cane flexion Supine flex Hold 5, 2x10  AAROM Hold 5, 2x10  Supine flex Hold 5, 2x10  Hold 5, 2x10  AAROM Hold 5, 2x10 reclined   Cane abduction  Standing Hold 5, 2x10    Standing Hold 5, 2x10    Ball roll out  Hold 5, 2x10    Hold 5, 2x10    SL ER 1 lb 2x10  Hold 5, 2x10    1 lb 2x10   SL abd 1 lb 2x10  2x10   1 lb 2x10   IR SOS    Hold 10, 1x10     Corner stretch    Hold 10, 1x10     Sleeper stretch Hold 10, 1x10    Hold 10, 1x10  Hold 10, 1x10    Post capsule stretch     Hold 10, 1x10    Supine flex 1 lb 2x10       Modalities                             Insurance:  AMA/CMS Eval/ Re-eval Auth #/ Referral # Total units  Start date  Expiration date Extension  Visit limitation?  PT only or  PT+OT? Co-Insurance   CMS       BOMN                   POC Start Date POC Expiration Date Signed POC?   1/25/24 3/7/24       Date               Visits/Units:  Used               Authed:  Remaining

## 2024-04-18 ENCOUNTER — APPOINTMENT (OUTPATIENT)
Dept: PHYSICAL THERAPY | Facility: CLINIC | Age: 59
End: 2024-04-18
Payer: COMMERCIAL

## 2024-04-23 ENCOUNTER — OFFICE VISIT (OUTPATIENT)
Dept: OBGYN CLINIC | Facility: CLINIC | Age: 59
End: 2024-04-23
Payer: COMMERCIAL

## 2024-04-23 ENCOUNTER — OFFICE VISIT (OUTPATIENT)
Dept: PHYSICAL THERAPY | Facility: CLINIC | Age: 59
End: 2024-04-23
Payer: COMMERCIAL

## 2024-04-23 VITALS
DIASTOLIC BLOOD PRESSURE: 82 MMHG | BODY MASS INDEX: 25.06 KG/M2 | WEIGHT: 185 LBS | SYSTOLIC BLOOD PRESSURE: 137 MMHG | HEART RATE: 71 BPM | HEIGHT: 72 IN

## 2024-04-23 DIAGNOSIS — Z98.890 S/P RIGHT ROTATOR CUFF REPAIR: ICD-10-CM

## 2024-04-23 DIAGNOSIS — Z98.890 S/P RIGHT ROTATOR CUFF REPAIR: Primary | ICD-10-CM

## 2024-04-23 DIAGNOSIS — S46.011D TRAUMATIC COMPLETE TEAR OF RIGHT ROTATOR CUFF, SUBSEQUENT ENCOUNTER: Primary | ICD-10-CM

## 2024-04-23 PROCEDURE — 97140 MANUAL THERAPY 1/> REGIONS: CPT

## 2024-04-23 PROCEDURE — 97110 THERAPEUTIC EXERCISES: CPT

## 2024-04-23 PROCEDURE — 97112 NEUROMUSCULAR REEDUCATION: CPT

## 2024-04-23 PROCEDURE — 99213 OFFICE O/P EST LOW 20 MIN: CPT | Performed by: ORTHOPAEDIC SURGERY

## 2024-04-23 NOTE — PROGRESS NOTES
SUBJECTIVE:  Patient is a 58 y.o. year old male who presents for follow up now 3 months and 1 week s/p Right shoulder arthroscopy, rotator cuff repair - Right performed on  1/17/2024.  Today patient reports muscle soreness following PT sessions, but reports no notable pain in the shoulder. He continues PT consistently per protocol and has started strengthening exercises twice weekly. The patient is hoping to move forward with more challenging exercises.        VITALS:  Vitals:    04/23/24 0949   BP: 137/82   Pulse: 71       PHYSICAL EXAMINATION:  General: well developed and well nourished, alert, oriented times 3, and appears comfortable  Psychiatric: Normal    MUSCULOSKELETAL EXAMINATION:    Right Upper Extremity    Incision: well-healed  Range of Motion:  and symmetric, ER 80 and symmetric, IR   Strength: 4+/5 ER, 5/5 IR, 4+/5 Empty Can Test  +Axillary/AIN/PIN/Ulnar Motor Function   SILT throughout   Palpable Radial pulse       PLAN:    I believe the patient continues to progress appropriately through his post-op recovery and I am pleased with his clinical progression. Continue physical therapy per provided protocol with focus on progressing strengthening, working towards more challenging weights gradually. Use OTC medications as needed for pain control. Follow up in 6 weeks.

## 2024-04-23 NOTE — PROGRESS NOTES
Daily Note     Today's date: 2024  Patient name: Wander Lechuga  : 1965  MRN: [id number]  Referring provider: Meche Salcedo PA-C  Dx:   Encounter Diagnoses   Name Primary?    Traumatic complete tear of right rotator cuff, subsequent encounter Yes    S/P right rotator cuff repair                   Subjective: Pt reports he had a follow up with Dr Irwin this morning. Has been doing well with strengthening at home and get minimal soreness after sessions.       Objective: See treatment diary below      Assessment: Pt tolerated treatment well with no complaints of pain. Pt able to tolerate progression of strengthening and stability of shoulder. Initiated overhead endurance activities. Fatigued at end of session.       Plan: Continue with plan of care to decrease pain, improve mobility, strength, and function.          Precautions RC post op protocol  HEP [employer] access code: [email or web address]    Manuals 4/16 4/23 3/14 3/26 4   PROM Right shoulder all directions Right shoulder all directions Right shoulder flex, abd, ER  Right shoulder flex, abd, ER   Mobilization  GHJ post gr III GHJ distraction and post gr III GHJ distraction gr II  GHJ post and lateral gr III   STM                Neuro Re-Ed         Shoulder taps  Counter x20       Band retraction    blue 2x10    Ball taps OH  30 sec x2      Band ext Luann 4.0 3x10   30-0 degrees 2x10 blue    Supine alphabet At 120 x2 1lb At 120 x2 3lb At 90 x2  At 90 x2 1lb At 120 x2 1lb   RC iso   3 way Hold 5, 2x10 ea 3 way Hold 5, 2x10 ea    ER iso step out   Hold 5, x15 green Hold 5, 20 green    Standing scaption   2x10     Standing abd   Hold 5, 2x10  Hold 5, 2x10     Ther Ex        Hor abd  Green 2x10      Pulleys flex passive Hold 5, 2x10  Hold 5, 2x10    Hold 5, 2x10    IR/ER tband Green/red to fatigue ea Green to fatigue x2 sets   Yellow 2x10    Cane ER    Hold 5, 2x10 at 90    Prone T Hold 5, 2x10 1lb Hold 5, 2x10 2lb Hold 5, 2x10  Hold 5, 2x10  Hold 5, 2x10  1lb   Prone Y Hold 5, 2x10 1lb Hold 5, 2x10 2lb Hold 5, 2x10  Hold 5, 2x10  Hold 5, 2x10 1lb   Prone ext   Hold 5, 2x10  Hold 5, 2x10  Hold 5, 2x10 1lb   Tricep pull down        Cane flexion Supine flex Hold 5, 2x10   Supine flex Hold 5, 2x10  Hold 5, 2x10  AAROM Hold 5, 2x10 reclined   Cane abduction     Standing Hold 5, 2x10    Ball roll out  Hold 5, 2x10    Hold 5, 2x10    SL ER 1 lb 2x10  3lb 2x10 Hold 5, 2x10    1 lb 2x10   SL abd 1 lb 2x10 3 lb 2x10 2x10   1 lb 2x10   IR SOS    Hold 10, 1x10     Corner stretch    Hold 10, 1x10     Sleeper stretch Hold 10, 1x10    Hold 10, 1x10  Hold 10, 1x10    Post capsule stretch     Hold 10, 1x10    Supine flex 1 lb 2x10  3 lb 2x10      Modalities                             Insurance:  AMA/CMS Eval/ Re-eval Auth #/ Referral # Total units  Start date  Expiration date Extension  Visit limitation?  PT only or  PT+OT? Co-Insurance   CMS       BOMN                   POC Start Date POC Expiration Date Signed POC?   1/25/24 3/7/24       Date               Visits/Units:  Used               Authed:  Remaining

## 2024-04-25 ENCOUNTER — APPOINTMENT (OUTPATIENT)
Dept: PHYSICAL THERAPY | Facility: CLINIC | Age: 59
End: 2024-04-25
Payer: COMMERCIAL

## 2024-04-30 ENCOUNTER — EVALUATION (OUTPATIENT)
Dept: PHYSICAL THERAPY | Facility: CLINIC | Age: 59
End: 2024-04-30
Payer: COMMERCIAL

## 2024-04-30 DIAGNOSIS — Z98.890 S/P RIGHT ROTATOR CUFF REPAIR: ICD-10-CM

## 2024-04-30 DIAGNOSIS — S46.011D TRAUMATIC COMPLETE TEAR OF RIGHT ROTATOR CUFF, SUBSEQUENT ENCOUNTER: Primary | ICD-10-CM

## 2024-04-30 PROCEDURE — 97110 THERAPEUTIC EXERCISES: CPT

## 2024-04-30 PROCEDURE — 97112 NEUROMUSCULAR REEDUCATION: CPT

## 2024-04-30 PROCEDURE — 97140 MANUAL THERAPY 1/> REGIONS: CPT

## 2024-04-30 NOTE — PROGRESS NOTES
PT Re-Evaluation     Today's date: 2024  Patient name: Wander Lechuga  : 1965  MRN: [id number]  Referring provider: Meche Salcedo PA-C  Dx:   Encounter Diagnosis     ICD-10-CM    1. Traumatic complete tear of right rotator cuff, subsequent encounter  S46.011D       2. S/P right rotator cuff repair  Z98.890                      Assessment  Assessment details: Wander Lechuga is a 58 y.o. male who has been seen in physical therapy for 18 visits secondary to the diagnosis of Traumatic complete tear of right rotator cuff, subsequent encounter  (primary encounter diagnosis), and S/P right rotator cuff repair and is making steady gains towards established goals. The patient has demonstrated decreased pain level, improved strength upper extremity, improved postural awareness, improved upper extremity ROM, improved flexibility, and improved scapular stabilization. As a result, he has been able to increase functional activities such as performing all ADLs and activities around the home. He would benefit from continued PT services to address remaining impairments outlined in Progress Note and to maximize function.           Impairments: abnormal muscle firing, abnormal or restricted ROM, abnormal movement, activity intolerance, impaired physical strength, lacks appropriate home exercise program, pain with function and scapular dyskinesis    Symptom irritability: lowUnderstanding of Dx/Px/POC: good   Prognosis: good    Goals  Short Term Goals (6-8 weeks)  Pt will be independent in basic Home Exercise Program -met  Pt will demonstrate improved right shoulder PROM by 25 degrees in all directions-met  Pt will be able to sleep through night without waking up from pain-met  Pt will be able to initiate AROM of shoulder per post op protocol-met    Long Term Goals (10-12+ weeks) ONGOING  Pt will be independent in comprehensive Home Exercise Program  Pt will be able to perform all ADLs with pain no more than  2/10-met  Pt will demonstrate improved shoulder MMT strength to at least 4+/5  Pt will demonstrate improved FOTO status score by 10 points -met  Update goals dependent on patient progression       Plan  Patient would benefit from: skilled PT  Referral necessary: No  Planned modality interventions: cryotherapy and thermotherapy: hydrocollator packs  Planned therapy interventions: ADL retraining, body mechanics training, functional ROM exercises, home exercise program, graded exercise, joint mobilization, manual therapy, massage, neuromuscular re-education, patient education, postural training, strengthening, stretching, therapeutic activities, therapeutic exercise and therapeutic training  Frequency: 1-2x/week.  Duration in weeks: 6  Treatment plan discussed with: patient        Subjective Evaluation    History of Present Illness  Date of surgery: 2024  Mechanism of injury: surgery  Mechanism of injury: Pt reports he feels like he's at 80% of his function in shoulder. States he is able to perform all ADLs and self care activities with no pain. Continues to avoid post op precautions of no lifting. Has been compliant with performing HEP. Denies any sleep disturbances.   Patient Goals  Patient goals for therapy: decreased pain, increased motion, increased strength, independence with ADLs/IADLs and return to sport/leisure activities (ongoing)    Pain  Current pain ratin  At best pain ratin  At worst pain ratin  Location: right shoulder  Quality: tight  Aggravating factors: lifting  Progression: improved    Social Support    Employment status: working (desk-IT)  Exercise history: cardio, walking, house work      Diagnostic Tests  MRI studies: abnormal  Treatments  Current treatment: physical therapy        Objective     Postural Observations  Seated posture: good  Standing posture: good      Cervical/Thoracic Screen   Cervical range of motion within normal limits    Neurological Testing     Sensation      Shoulder   Left Shoulder   Intact: light touch    Right Shoulder   Intact: Light touch    Active Range of Motion   Left Shoulder   Flexion: 175 degrees   Abduction: 180 degrees   External rotation 45°: 80 degrees   Internal rotation 45°: 70 degrees     Right Shoulder   Flexion: 175 degrees   Abduction: 180 degrees   External rotation 45°: 90 degrees   Internal rotation 45°: 65 degrees     Scapular Mobility     Right Shoulder   Scapular mobility: WFL    Strength/Myotome Testing     Left Shoulder     Planes of Motion   Flexion: 5   Abduction: 5   External rotation at 0°: 5   Internal rotation at 0°: 5   Horizontal abduction: 4     Isolated Muscles   Lower trapezius: 4-     Right Shoulder     Planes of Motion   Flexion: 4   Abduction: 4-   External rotation at 0°: 4   Internal rotation at 0°: 5   Horizontal abduction: 4-     Isolated Muscles   Lower trapezius: 4-                    Precautions RC post op protocol  HEP MedBridge access code: [email or web address]    Manuals 4/16 4/23 4/30 3/26    PROM Right shoulder all directions Right shoulder all directions Right shoulder flex, abd, ER  Right shoulder flex, abd, ER   Mobilization  GHJ post gr III GHJ distraction and post gr III GHJ distraction post gr III  GHJ post and lateral gr III   STM                Neuro Re-Ed         Shoulder taps  Counter x20  Counter x20     Band retraction    blue 2x10    Ball taps OH  30 sec x2 30 sec x2     Band ext [place] 4.0 3x10   30-0 degrees 2x10 blue    Supine alphabet At 120 x2 1lb At 120 x2 3lb At 120 x2 3lb  At 90 x2 1lb At 120 x2 1lb   RC iso    3 way Hold 5, 2x10 ea    ER iso step out    Hold 5, 20 green    Ball taps   OH red 30 sec x3     Standing abd    Hold 5, 2x10     Ther Ex        Hor abd  Green 2x10 Green 2x10     Pulleys flex passive Hold 5, 2x10  Hold 5, 2x10  Hold 5, 2x10  Hold 5, 2x10    IR/ER tband Green/red to fatigue ea Green to fatigue x2 sets Green to fatigue x2 sets  Yellow 2x10    Cane ER    Hold 5, 2x10 at 90    Prone  T Hold 5, 2x10 1lb Hold 5, 2x10 2lb Hold 5, 2x10 2lb Hold 5, 2x10  Hold 5, 2x10 1lb   Prone Y Hold 5, 2x10 1lb Hold 5, 2x10 2lb Hold 5, 2x10 2lb Hold 5, 2x10  Hold 5, 2x10 1lb   Prone ext    Hold 5, 2x10  Hold 5, 2x10 1lb   Wall walks   Yellow 6 laps     Cane flexion Supine flex Hold 5, 2x10    Hold 5, 2x10  AAROM Hold 5, 2x10 reclined   Cane abduction     Standing Hold 5, 2x10    Ball roll out  Hold 5, 2x10    Hold 5, 2x10    SL ER 1 lb 2x10  3lb 2x10 3lb 2x10    1 lb 2x10   SL abd 1 lb 2x10 3 lb 2x10 3lb 2x10  1 lb 2x10   IR SOS    Hold 10, 1x10     Corner stretch    Hold 10, 1x10     Sleeper stretch Hold 10, 1x10    Hold 10, 1x10  Hold 10, 1x10    Post capsule stretch     Hold 10, 1x10    Supine flex 1 lb 2x10  3 lb 2x10 3lb 2x10     Modalities                             Insurance:  AMA/CMS Eval/ Re-eval Auth #/ Referral # Total units  Start date  Expiration date Extension  Visit limitation?  PT only or  PT+OT? Co-Insurance   CMS       BOMN                   POC Start Date POC Expiration Date Signed POC?   1/25/24 3/7/24       Date               Visits/Units:  Used               Authed:  Remaining

## 2024-05-14 ENCOUNTER — APPOINTMENT (OUTPATIENT)
Dept: PHYSICAL THERAPY | Facility: CLINIC | Age: 59
End: 2024-05-14
Payer: COMMERCIAL

## 2024-05-21 ENCOUNTER — OFFICE VISIT (OUTPATIENT)
Dept: PHYSICAL THERAPY | Facility: CLINIC | Age: 59
End: 2024-05-21
Payer: COMMERCIAL

## 2024-05-21 DIAGNOSIS — Z98.890 S/P RIGHT ROTATOR CUFF REPAIR: ICD-10-CM

## 2024-05-21 DIAGNOSIS — S46.011D TRAUMATIC COMPLETE TEAR OF RIGHT ROTATOR CUFF, SUBSEQUENT ENCOUNTER: Primary | ICD-10-CM

## 2024-05-21 PROCEDURE — 97140 MANUAL THERAPY 1/> REGIONS: CPT

## 2024-05-21 PROCEDURE — 97112 NEUROMUSCULAR REEDUCATION: CPT

## 2024-05-21 PROCEDURE — 97110 THERAPEUTIC EXERCISES: CPT

## 2024-05-21 NOTE — PROGRESS NOTES
Daily Note     Today's date: 2024  Patient name: Wander Lechuga  : 1965  MRN: [id number]  Referring provider: Meche Salcedo PA-C  Dx:   Encounter Diagnoses   Name Primary?    Traumatic complete tear of right rotator cuff, subsequent encounter Yes    S/P right rotator cuff repair                   Subjective: Pt reports is shoulder has been feeling good and feels he can do most anything. He would like to transition to HEP at this time.       Objective: See treatment diary below      Assessment: Progressed pt's program per treatment diary and demonstrates good tolerance. HEP updated today. Discussed POC with pt and in agreement to continue with progression of HEP that was updated today and follow up as needed/after follow up with Dr Irwin. Progressing well with shoulder strength and stability. No discomfort reported with any progression. Discussed return to gym activities/modifications to minimize strain on repair, demonstrates understanding.       Plan: Continue with plan of care and progress as tolerated. Follow up as needed.         Precautions RC post op protocol  HEP MedBridge access code: [email or web address]    Manuals 4/16 4/23 4/30 3/26 5/21   PROM Right shoulder all directions Right shoulder all directions Right shoulder flex, abd, ER  Right shoulder flex, abd, ER   Mobilization  GHJ post gr III GHJ distraction and post gr III GHJ distraction post gr III  GHJ post and lateral gr III   STM                Neuro Re-Ed         Shoulder taps  Counter x20  Counter x20  Push up cpunter 2x10   Band retraction    blue 2x10    Ball taps OH  30 sec x2 30 sec x2  Wall walks clock red to fatigue   Band ext [place] 4.0 3x10   30-0 degrees 2x10 blue    Supine alphabet At 120 x2 1lb At 120 x2 3lb At 120 x2 3lb  At 90 x2 1lb At 120 x2 1lb   RC iso    3 way Hold 5, 2x10 ea    ER iso step out    Hold 5, 20 green    Ball taps   OH red 30 sec x3     Standing abd    Hold 5, 2x10     Ther Ex        Hor abd  Green 2x10 Green  2x10  3 way shoulder red 2x10   Pulleys flex passive Hold 5, 2x10  Hold 5, 2x10  Hold 5, 2x10  Hold 5, 2x10    IR/ER tband Green/red to fatigue ea Green to fatigue x2 sets Green to fatigue x2 sets      Cane ER    Hold 5, 2x10 at 90    Prone T Hold 5, 2x10 1lb Hold 5, 2x10 2lb Hold 5, 2x10 2lb Hold 5, 2x10     Prone Y Hold 5, 2x10 1lb Hold 5, 2x10 2lb Hold 5, 2x10 2lb Hold 5, 2x10     Prone ext    Hold 5, 2x10     Wall walks   Yellow 6 laps     Cane flexion Supine flex Hold 5, 2x10    Hold 5, 2x10     OH press  s   3lb 3x10   Ball roll out  Hold 5, 2x10       SL ER 1 lb 2x10  3lb 2x10 3lb 2x10      SL abd 1 lb 2x10 3 lb 2x10 3lb 2x10  Lat raise 3lb 2x10   IR SOS    Hold 10, 1x10     Corner stretch    Hold 10, 1x10     Sleeper stretch Hold 10, 1x10    Hold 10, 1x10  \\   Post capsule stretch        Supine flex 1 lb 2x10  3 lb 2x10 3lb 2x10     Modalities                             Insurance:  AMA/CMS Eval/ Re-eval Auth #/ Referral # Total units  Start date  Expiration date Extension  Visit limitation?  PT only or  PT+OT? Co-Insurance   CMS       BOMN                   POC Start Date POC Expiration Date Signed POC?   1/25/24 3/7/24       Date               Visits/Units:  Used               Authed:  Remaining

## 2024-05-28 ENCOUNTER — APPOINTMENT (OUTPATIENT)
Dept: PHYSICAL THERAPY | Facility: CLINIC | Age: 59
End: 2024-05-28
Payer: COMMERCIAL

## 2024-06-18 ENCOUNTER — OFFICE VISIT (OUTPATIENT)
Dept: OBGYN CLINIC | Facility: CLINIC | Age: 59
End: 2024-06-18
Payer: COMMERCIAL

## 2024-06-18 VITALS
SYSTOLIC BLOOD PRESSURE: 127 MMHG | HEART RATE: 73 BPM | HEIGHT: 72 IN | BODY MASS INDEX: 25.06 KG/M2 | WEIGHT: 185 LBS | DIASTOLIC BLOOD PRESSURE: 80 MMHG

## 2024-06-18 DIAGNOSIS — Z98.890 S/P RIGHT ROTATOR CUFF REPAIR: Primary | ICD-10-CM

## 2024-06-18 PROCEDURE — 99212 OFFICE O/P EST SF 10 MIN: CPT | Performed by: ORTHOPAEDIC SURGERY

## 2024-06-18 NOTE — PROGRESS NOTES
"SUBJECTIVE:  Patient is a 59 y.o. year old male who presents for follow up now 5 months s/p Right shoulder arthroscopy, rotator cuff repair - Right performed on  1/17/2024.  Today, the patient reports he is \"99.99%\" improved since surgery. He notes his only deficit is a side-to-side difference in internal rotation, but this does not limit him at all. The patient completed his post-op PT and plans to join the gym to continue exercising/strengthening regularly. He denies new injury/trauma and has no other complaints at this time.      VITALS:  Vitals:    06/18/24 0854   BP: 127/80   Pulse: 73       PHYSICAL EXAMINATION:  General: well developed and well nourished, alert, oriented times 3, and appears comfortable  Psychiatric: Normal    MUSCULOSKELETAL EXAMINATION:    Right Upper Extremity    Incision: well-healed  Range of Motion:  and symmetric, ER 70 and symmetric, IR T10 compard to nearly T8 contralaterally  Strength: 5/5 Empty Can Test, 5/5 ER, 5/5 IR  +Axillary/AIN/PIN/Ulnar Motor Function   SILT throughout   Palpable Radial pulse       PLAN:    I believe the patient has progressed appropriately through his post-op protocol and I am very pleased with his clinical presentation today. We discussed that he may note some small deficit with his internal rotation, but I do not expect this to limit him. At this point, [place] is cleared to return to all activities as tolerated. We encouraged him to remain active, especially with high reps of lower weights. The patient can use OTC medications as needed for pain control. We will plan to follow up with [place] as needed.    "

## 2024-07-22 ENCOUNTER — OFFICE VISIT (OUTPATIENT)
Dept: FAMILY MEDICINE CLINIC | Facility: CLINIC | Age: 59
End: 2024-07-22
Payer: COMMERCIAL

## 2024-07-22 VITALS
RESPIRATION RATE: 18 BRPM | DIASTOLIC BLOOD PRESSURE: 80 MMHG | SYSTOLIC BLOOD PRESSURE: 118 MMHG | OXYGEN SATURATION: 98 % | HEART RATE: 68 BPM | TEMPERATURE: 98 F | BODY MASS INDEX: 25.44 KG/M2 | WEIGHT: 187.8 LBS | HEIGHT: 72 IN

## 2024-07-22 DIAGNOSIS — I10 PRIMARY HYPERTENSION: ICD-10-CM

## 2024-07-22 DIAGNOSIS — J45.40 MODERATE PERSISTENT ASTHMA WITHOUT COMPLICATION: ICD-10-CM

## 2024-07-22 DIAGNOSIS — Z23 ENCOUNTER FOR IMMUNIZATION: ICD-10-CM

## 2024-07-22 DIAGNOSIS — Z12.5 SCREENING FOR PROSTATE CANCER: ICD-10-CM

## 2024-07-22 DIAGNOSIS — Z00.00 ANNUAL PHYSICAL EXAM: Primary | ICD-10-CM

## 2024-07-22 PROCEDURE — 90715 TDAP VACCINE 7 YRS/> IM: CPT | Performed by: FAMILY MEDICINE

## 2024-07-22 PROCEDURE — 99396 PREV VISIT EST AGE 40-64: CPT | Performed by: FAMILY MEDICINE

## 2024-07-22 PROCEDURE — 90471 IMMUNIZATION ADMIN: CPT | Performed by: FAMILY MEDICINE

## 2024-07-22 RX ORDER — ALBUTEROL SULFATE 90 UG/1
2 AEROSOL, METERED RESPIRATORY (INHALATION) EVERY 6 HOURS PRN
Qty: 8 G | Refills: 1 | Status: SHIPPED | OUTPATIENT
Start: 2024-07-22

## 2024-07-22 RX ORDER — AMLODIPINE BESYLATE 5 MG/1
5 TABLET ORAL DAILY
Qty: 90 TABLET | Refills: 1 | Status: SHIPPED | OUTPATIENT
Start: 2024-07-22 | End: 2024-07-23 | Stop reason: SDUPTHER

## 2024-07-22 RX ORDER — FLUTICASONE FUROATE AND VILANTEROL 100; 25 UG/1; UG/1
1 POWDER RESPIRATORY (INHALATION) DAILY
Qty: 180 BLISTER | Refills: 3 | Status: SHIPPED | OUTPATIENT
Start: 2024-07-22 | End: 2024-10-20

## 2024-07-22 NOTE — PROGRESS NOTES
Chief Complaint   Patient presents with   • Physical Exam     Would like Tetnus shot        Patient ID: Wander Lechuga is a 59 y.o. male.    HPI  Pt is seeing for annual PE     The following portions of the patient's history were reviewed and updated as appropriate: allergies, current medications, past family history, past medical history, past social history, past surgical history and problem list.    Review of Systems   Constitutional:  Negative for fatigue, fever and unexpected weight change.   HENT:  Negative for congestion, ear discharge, ear pain, hearing loss, rhinorrhea, sinus pressure, sore throat and trouble swallowing.    Eyes: Negative.    Respiratory: Negative.     Cardiovascular: Negative.    Gastrointestinal: Negative.    Endocrine: Negative.    Genitourinary: Negative.    Musculoskeletal: Negative.    Skin: Negative.    Neurological:  Negative for dizziness, weakness, light-headedness and numbness.   Hematological: Negative.    Psychiatric/Behavioral: Negative.         Current Outpatient Medications   Medication Sig Dispense Refill   • amLODIPine (NORVASC) 5 mg tablet TAKE 1 TABLET BY MOUTH EVERY DAY 90 tablet 1   • cetirizine (ZyrTEC) 10 mg tablet Take 1 tablet (10 mg total) by mouth daily at bedtime (Patient not taking: Reported on 4/19/2024) 30 tablet 5   • ergocalciferol (VITAMIN D2) 50,000 units TAKE 1 CAPSULE BY MOUTH ONCE A WEEK (Patient not taking: Reported on 7/22/2024) 12 capsule 1   • Fluticasone Furoate-Vilanterol 100-25 mcg/actuation inhaler Inhale 1 puff daily Rinse mouth after use. 180 blister 0   • Mometasone Furoate POWD Compound Mometasone 1 mg capsule to be added to sinus rinse twice daily (Patient not taking: Reported on 7/22/2024) 180 g 3     No current facility-administered medications for this visit.       Objective:    /80 (BP Location: Left arm, Patient Position: Sitting, Cuff Size: Large)   Pulse 68   Temp 98 °F (36.7 °C) (Temporal)   Resp 18   Ht 6' (1.829 m)    Wt 85.2 kg (187 lb 12.8 oz)   SpO2 98%   BMI 25.47 kg/m²        Physical Exam  Constitutional:       General: He is not in acute distress.     Appearance: Normal appearance. He is well-developed. He is not ill-appearing.   HENT:      Head: Normocephalic and atraumatic.      Right Ear: Hearing, tympanic membrane, ear canal and external ear normal.      Left Ear: Hearing, tympanic membrane, ear canal and external ear normal.      Nose: No congestion or rhinorrhea.      Mouth/Throat:      Pharynx: No oropharyngeal exudate or posterior oropharyngeal erythema.   Eyes:      Extraocular Movements: Extraocular movements intact.      Conjunctiva/sclera: Conjunctivae normal.   Neck:      Thyroid: No thyroid mass or thyromegaly.      Vascular: No JVD.   Cardiovascular:      Rate and Rhythm: Normal rate and regular rhythm.      Heart sounds: Normal heart sounds. No murmur heard.     No gallop.   Pulmonary:      Effort: No respiratory distress.      Breath sounds: Normal breath sounds. No wheezing, rhonchi or rales.   Abdominal:      Palpations: Abdomen is soft.      Tenderness: There is no abdominal tenderness. There is no right CVA tenderness or left CVA tenderness.   Musculoskeletal:         General: No swelling or tenderness.      Cervical back: Normal range of motion and neck supple. No rigidity or tenderness.      Right lower leg: No edema.      Left lower leg: No edema.   Lymphadenopathy:      Cervical: No cervical adenopathy.   Skin:     Coloration: Skin is not pale.      Findings: No rash.   Neurological:      Mental Status: He is alert and oriented to person, place, and time.      Cranial Nerves: No cranial nerve deficit.      Motor: No weakness.      Gait: Gait normal.   Psychiatric:         Mood and Affect: Mood normal.         Behavior: Behavior normal.         Thought Content: Thought content normal.         Judgment: Judgment normal.           Labs in chart were reviewed.      Assessment/Plan:          Diagnoses and all orders for this visit:    Annual physical exam  -     CBC; Future  -     Comprehensive metabolic panel; Future  -     Lipid panel; Future  -     TSH, 3rd generation; Future  -     Hemoglobin A1C; Future  -     CBC  -     Comprehensive metabolic panel  -     Lipid panel  -     TSH, 3rd generation  -     Hemoglobin A1C    Primary hypertension  -     amLODIPine (NORVASC) 5 mg tablet; Take 1 tablet (5 mg total) by mouth daily    Moderate persistent asthma without complication  -     Fluticasone Furoate-Vilanterol 100-25 mcg/actuation inhaler; Inhale 1 puff daily Rinse mouth after use.  -     albuterol (ProAir HFA) 90 mcg/act inhaler; Inhale 2 puffs every 6 (six) hours as needed for shortness of breath or wheezing    Encounter for immunization  -     TDAP VACCINE GREATER THAN OR EQUAL TO 6YO IM    Screening for prostate cancer  -     PSA Total (Reflex To Free); Future  -     PSA Total (Reflex To Free)            Rto in 1 y                 Ayla Bruce MD

## 2024-07-23 DIAGNOSIS — I10 PRIMARY HYPERTENSION: ICD-10-CM

## 2024-07-23 RX ORDER — AMLODIPINE BESYLATE 2.5 MG/1
2.5 TABLET ORAL DAILY
Qty: 90 TABLET | Refills: 3 | Status: SHIPPED | OUTPATIENT
Start: 2024-07-23

## 2024-09-07 DIAGNOSIS — J45.40 MODERATE PERSISTENT ASTHMA WITHOUT COMPLICATION: ICD-10-CM

## 2024-09-07 RX ORDER — ALBUTEROL SULFATE 90 UG/1
2 AEROSOL, METERED RESPIRATORY (INHALATION) EVERY 6 HOURS PRN
Qty: 8.5 G | Refills: 1 | Status: SHIPPED | OUTPATIENT
Start: 2024-09-07

## 2024-12-24 ENCOUNTER — OFFICE VISIT (OUTPATIENT)
Dept: FAMILY MEDICINE CLINIC | Facility: CLINIC | Age: 59
End: 2024-12-24
Payer: COMMERCIAL

## 2024-12-24 VITALS
HEART RATE: 83 BPM | RESPIRATION RATE: 16 BRPM | WEIGHT: 191.6 LBS | DIASTOLIC BLOOD PRESSURE: 80 MMHG | SYSTOLIC BLOOD PRESSURE: 136 MMHG | OXYGEN SATURATION: 99 % | TEMPERATURE: 97.3 F | HEIGHT: 72 IN | BODY MASS INDEX: 25.95 KG/M2

## 2024-12-24 DIAGNOSIS — M54.31 SCIATICA OF RIGHT SIDE: Primary | ICD-10-CM

## 2024-12-24 PROCEDURE — 99214 OFFICE O/P EST MOD 30 MIN: CPT | Performed by: FAMILY MEDICINE

## 2024-12-24 RX ORDER — METHYLPREDNISOLONE 4 MG/1
TABLET ORAL
Qty: 21 EACH | Refills: 0 | Status: SHIPPED | OUTPATIENT
Start: 2024-12-24

## 2024-12-24 RX ORDER — IBUPROFEN 600 MG/1
600 TABLET, FILM COATED ORAL EVERY 8 HOURS PRN
Qty: 30 TABLET | Refills: 0 | Status: SHIPPED | OUTPATIENT
Start: 2024-12-24

## 2024-12-24 RX ORDER — CYCLOBENZAPRINE HCL 10 MG
10 TABLET ORAL
Qty: 20 TABLET | Refills: 0 | Status: SHIPPED | OUTPATIENT
Start: 2024-12-24

## 2024-12-24 NOTE — PROGRESS NOTES
Chief Complaint   Patient presents with   • Sciatica     Sciatica pain running down right leg         Patient ID: Wander Lechuga is a 59 y.o. male.    Back Pain  This is a new problem. The current episode started in the past 7 days. The problem occurs intermittently. The problem is unchanged. The pain is present in the sacro-iliac. The quality of the pain is described as aching. Radiates to: L leg. The pain is at a severity of 5/10. The pain is moderate. The pain is Worse during the day. The symptoms are aggravated by bending and position. Associated symptoms include leg pain. Pertinent negatives include no abdominal pain, bladder incontinence, bowel incontinence, numbness, paresis, paresthesias, pelvic pain, perianal numbness, tingling, weakness or weight loss. He has tried nothing for the symptoms. The treatment provided no relief.         The following portions of the patient's history were reviewed and updated as appropriate: allergies, current medications, past family history, past medical history, past social history, past surgical history and problem list.    Review of Systems   Constitutional:  Negative for weight loss.   Respiratory: Negative.     Cardiovascular: Negative.    Gastrointestinal:  Negative for abdominal pain and bowel incontinence.   Genitourinary: Negative.  Negative for bladder incontinence and pelvic pain.   Musculoskeletal:  Positive for back pain.   Neurological:  Negative for tingling, weakness, numbness and paresthesias.       Current Outpatient Medications   Medication Sig Dispense Refill   • albuterol (PROVENTIL HFA,VENTOLIN HFA) 90 mcg/act inhaler INHALE 2 PUFFS EVERY 6 (SIX) HOURS AS NEEDED FOR SHORTNESS OF BREATH OR WHEEZING 8.5 g 1   • amLODIPine (NORVASC) 2.5 mg tablet Take 1 tablet (2.5 mg total) by mouth daily 90 tablet 3   • Fluticasone Furoate-Vilanterol 100-25 mcg/actuation inhaler Inhale 1 puff daily Rinse mouth after use. 180 blister 3   • cetirizine (ZyrTEC) 10 mg  tablet Take 1 tablet (10 mg total) by mouth daily at bedtime (Patient not taking: Reported on 12/24/2024) 30 tablet 5   • ergocalciferol (VITAMIN D2) 50,000 units TAKE 1 CAPSULE BY MOUTH ONCE A WEEK (Patient not taking: Reported on 12/24/2024) 12 capsule 1     No current facility-administered medications for this visit.       Objective:    /80 (BP Location: Left arm, Patient Position: Sitting, Cuff Size: Large)   Pulse 83   Temp (!) 97.3 °F (36.3 °C) (Temporal)   Resp 16   Ht 6' (1.829 m)   Wt 86.9 kg (191 lb 9.6 oz)   SpO2 99%   BMI 25.99 kg/m²        Physical Exam  Constitutional:       General: He is not in acute distress.     Appearance: He is not ill-appearing.   Cardiovascular:      Rate and Rhythm: Normal rate.   Pulmonary:      Effort: Pulmonary effort is normal.   Musculoskeletal:      Lumbar back: Normal.        Back:       Right lower leg: No edema.   Neurological:      Mental Status: He is alert.                 Assessment/Plan:         Diagnoses and all orders for this visit:    Sciatica of right side  -     methylPREDNISolone 4 MG tablet therapy pack; Use as directed on package  -     cyclobenzaprine (FLEXERIL) 10 mg tablet; Take 1 tablet (10 mg total) by mouth daily at bedtime  -     ibuprofen (MOTRIN) 600 mg tablet; Take 1 tablet (600 mg total) by mouth every 8 (eight) hours as needed for moderate pain        Will consider PT if not better                         Ayla Bruce MD

## 2025-04-01 DIAGNOSIS — J45.40 MODERATE PERSISTENT ASTHMA WITHOUT COMPLICATION: ICD-10-CM

## 2025-04-02 RX ORDER — FLUTICASONE FUROATE AND VILANTEROL 100; 25 UG/1; UG/1
1 POWDER RESPIRATORY (INHALATION) DAILY
Qty: 180 BLISTER | Refills: 1 | Status: SHIPPED | OUTPATIENT
Start: 2025-04-02 | End: 2025-04-03

## 2025-04-03 ENCOUNTER — OFFICE VISIT (OUTPATIENT)
Dept: FAMILY MEDICINE CLINIC | Facility: CLINIC | Age: 60
End: 2025-04-03
Payer: COMMERCIAL

## 2025-04-03 VITALS
HEART RATE: 82 BPM | DIASTOLIC BLOOD PRESSURE: 82 MMHG | HEIGHT: 72 IN | OXYGEN SATURATION: 98 % | WEIGHT: 188 LBS | TEMPERATURE: 97.6 F | BODY MASS INDEX: 25.47 KG/M2 | RESPIRATION RATE: 14 BRPM | SYSTOLIC BLOOD PRESSURE: 130 MMHG

## 2025-04-03 DIAGNOSIS — J45.40 MODERATE PERSISTENT ASTHMA WITHOUT COMPLICATION: Primary | ICD-10-CM

## 2025-04-03 DIAGNOSIS — R05.1 ACUTE COUGH: ICD-10-CM

## 2025-04-03 PROCEDURE — 99214 OFFICE O/P EST MOD 30 MIN: CPT | Performed by: FAMILY MEDICINE

## 2025-04-03 RX ORDER — FLUTICASONE FUROATE AND VILANTEROL 200; 25 UG/1; UG/1
1 POWDER RESPIRATORY (INHALATION) DAILY
Qty: 180 BLISTER | Refills: 3 | Status: SHIPPED | OUTPATIENT
Start: 2025-04-03 | End: 2026-03-29

## 2025-04-03 RX ORDER — DEXTROMETHORPHAN HYDROBROMIDE AND PROMETHAZINE HYDROCHLORIDE 15; 6.25 MG/5ML; MG/5ML
5 SYRUP ORAL 4 TIMES DAILY PRN
Qty: 118 ML | Refills: 0 | Status: SHIPPED | OUTPATIENT
Start: 2025-04-03

## 2025-04-03 RX ORDER — PREDNISONE 20 MG/1
40 TABLET ORAL DAILY
Qty: 10 TABLET | Refills: 0 | Status: SHIPPED | OUTPATIENT
Start: 2025-04-03 | End: 2025-04-08

## 2025-04-03 RX ORDER — AZITHROMYCIN 250 MG/1
TABLET, FILM COATED ORAL
Qty: 6 TABLET | Refills: 0 | Status: SHIPPED | OUTPATIENT
Start: 2025-04-03 | End: 2025-04-07

## 2025-04-03 NOTE — PROGRESS NOTES
Name: Wander Lechuga      : 1965      MRN: [id number]  Encounter Provider: Ayla Bruce MD  Encounter Date: 4/3/2025   Encounter department: [facility]    Assessment & Plan  Moderate persistent asthma without complication  Will increase Breo dose   Orders:  •  fluticasone-vilanterol 200-25 mcg/actuation inhaler; Inhale 1 puff daily Rinse mouth after use.  •  predniSONE 20 mg tablet; Take 2 tablets (40 mg total) by mouth daily for 5 days    Acute cough    Orders:  •  azithromycin (ZITHROMAX) 250 mg tablet; Take 2 tablets today then 1 tablet daily x 4 days  •  promethazine-dextromethorphan (PHENERGAN-DM) 6.25-15 mg/5 mL oral syrup; Take 5 mL by mouth 4 (four) times a day as needed for cough         History of Present Illness     Cough  This is a new problem. The current episode started in the past 7 days. The problem has been unchanged. The problem occurs every few hours. The cough is Productive of sputum. Associated symptoms include nasal congestion, rhinorrhea, shortness of breath and wheezing. Pertinent negatives include no chest pain, chills, ear congestion, ear pain, fever, headaches, heartburn, hemoptysis, myalgias, postnasal drip, rash, sore throat, sweats or weight loss. Nothing aggravates the symptoms. He has tried OTC cough suppressant for the symptoms. The treatment provided no relief. His past medical history is significant for asthma.   Fatigue  Associated symptoms include coughing and fatigue. Pertinent negatives include no abdominal pain, chest pain, chills, fever, headaches, myalgias, neck pain, rash, sore throat or vomiting.   Shortness of Breath  The current episode started in the past 7 days. The problem occurs rarely. The problem has been gradually worsening since onset. Associated symptoms include coughing, fatigue, orthopnea, rhinorrhea and wheezing. Pertinent negatives include no chest pain, leg swelling, sore throat or sweats. The symptoms are aggravated  by any activity. His past medical history is significant for asthma.     Review of Systems   Constitutional:  Positive for fatigue. Negative for chills, fever and weight loss.   HENT:  Positive for rhinorrhea. Negative for ear pain, postnasal drip and sore throat.    Respiratory:  Positive for cough, shortness of breath and wheezing. Negative for hemoptysis.    Cardiovascular:  Positive for orthopnea. Negative for chest pain and leg swelling.   Gastrointestinal:  Negative for abdominal pain, heartburn and vomiting.   Musculoskeletal:  Negative for myalgias and neck pain.   Skin:  Negative for rash.   Neurological:  Negative for headaches.     Past Medical History:   Diagnosis Date   • Asthma    • Contact lens/glasses fitting     and glasses   • COVID-19 01/2023    mild s/s   • GERD (gastroesophageal reflux disease)    • Hypertension    • Tinnitus      Past Surgical History:   Procedure Laterality Date   • APPENDECTOMY      age 40   • NASAL SEPTOPLASTY W/ TURBINOPLASTY N/A 12/20/2023    Procedure: SEPTOPLASTY,TURBINOPLASTY;  Surgeon: Jose Rafael Barkley MD;  Location: [facility];  Service: ENT   • MA NSL/SINUS NDSC MAX ANTROST W/RMVL TISS MAX SINUS N/A 12/20/2023    Procedure: FUNCTIONAL ENDOSCOPIC SINUS SURGERY (FESS) IMAGED GUIDED BILATERAL MAXILLARY ANTROSTOMY TOTAL ETHMOIDECTOMY;  Surgeon: Jose Rafael Barkley MD;  Location: [facility];  Service: ENT   • MA SURGICAL ARTHROSCOPY SHOULDER W/ROTATOR CUFF RPR Right 1/17/2024    Procedure: Right shoulder arthroscopy, rotator cuff repair;  Surgeon: Jose Rafael Irwin MD;  Location: [facility];  Service: Orthopedics   • TONSILLECTOMY     • WISDOM TOOTH EXTRACTION       Family History   Problem Relation Age of Onset   • Lung cancer Mother    • Cancer Mother         lung,brain   • No Known Problems Sister    • No Known Problems Brother    • No Known Problems Sister      Social History     Tobacco Use   • Smoking status: Former     Current packs/day: 0.50     Average  packs/day: 0.3 packs/day for 26.8 years (7.0 ttl pk-yrs)     Types: Cigarettes     Start date: 6/23/1998   • Smokeless tobacco: Never   • Tobacco comments:     vapes daily   Vaping Use   • Vaping status: Former   • Substances: Nicotine   Substance and Sexual Activity   • Alcohol use: Not Currently     Alcohol/week: 1.0 standard drink of alcohol     Types: 1 Standard drinks or equivalent per week     Comment: fri nights has 1 drink   • Drug use: Never   • Sexual activity: Yes     Partners: Female     Birth control/protection: None     Current Outpatient Medications on File Prior to Visit   Medication Sig   • albuterol (PROVENTIL HFA,VENTOLIN HFA) 90 mcg/act inhaler INHALE 2 PUFFS EVERY 6 (SIX) HOURS AS NEEDED FOR SHORTNESS OF BREATH OR WHEEZING   • amLODIPine (NORVASC) 2.5 mg tablet Take 1 tablet (2.5 mg total) by mouth daily   • [DISCONTINUED] Fluticasone Furoate-Vilanterol 100-25 mcg/actuation inhaler Inhale 1 puff daily Rinse mouth after use.   • cetirizine (ZyrTEC) 10 mg tablet Take 1 tablet (10 mg total) by mouth daily at bedtime (Patient not taking: Reported on 4/3/2025)   • [DISCONTINUED] cyclobenzaprine (FLEXERIL) 10 mg tablet Take 1 tablet (10 mg total) by mouth daily at bedtime (Patient not taking: Reported on 4/3/2025)   • [DISCONTINUED] ergocalciferol (VITAMIN D2) 50,000 units TAKE 1 CAPSULE BY MOUTH ONCE A WEEK (Patient not taking: Reported on 7/22/2024)   • [DISCONTINUED] ibuprofen (MOTRIN) 600 mg tablet Take 1 tablet (600 mg total) by mouth every 8 (eight) hours as needed for moderate pain (Patient not taking: Reported on 4/3/2025)   • [DISCONTINUED] methylPREDNISolone 4 MG tablet therapy pack Use as directed on package (Patient not taking: Reported on 4/3/2025)     No Known Allergies  Immunization History   Administered Date(s) Administered   • COVID-19 MODERNA VACC 0.5 ML IM 03/08/2021, 03/08/2021, 12/12/2021, 06/08/2022   • Hep B, Adolescent or Pediatric 01/06/2014, 02/17/2014, 09/08/2014   •  Hepatitis B 01/06/2014, 02/17/2014, 09/08/2014   • INFLUENZA 08/28/2018, 12/12/2021   • Influenza, injectable, quadrivalent, preservative free 0.5 mL 10/03/2019   • Influenza, seasonal, injectable 08/28/2018   • Pneumococcal Conjugate 13-Valent 05/10/2016   • Tdap 09/05/2013, 09/05/2013, 07/22/2024   • Zoster 01/06/2020, 01/06/2020, 05/14/2020, 05/14/2020     Objective   /82 (BP Location: Left arm, Patient Position: Sitting, Cuff Size: Adult)   Pulse 82   Temp 97.6 °F (36.4 °C) (Temporal)   Resp 14   Ht 6' (1.829 m)   Wt 85.3 kg (188 lb)   SpO2 98%   BMI 25.50 kg/m²     Physical Exam  Constitutional:       General: He is not in acute distress.     Appearance: He is not ill-appearing.   HENT:      Nose: No congestion or rhinorrhea.      Mouth/Throat:      Pharynx: No oropharyngeal exudate or posterior oropharyngeal erythema.   Cardiovascular:      Rate and Rhythm: Normal rate and regular rhythm.      Heart sounds: No murmur heard.     No gallop.   Pulmonary:      Effort: Pulmonary effort is normal. No respiratory distress.      Breath sounds: Wheezing present. No rhonchi or rales.   Neurological:      Mental Status: He is alert.         Answers submitted by the patient for this visit:  Shortness of Breath Questionnaire (Submitted on 4/3/2025)  Chief Complaint: Shortness of breath  Chronicity: recurrent  Episode duration: 15 Minutes  hemoptysis: No  sputum production: No  PND: No  syncope: No  claudication: No  leg pain: No  coryza: Yes  swollen glands: No

## 2025-04-03 NOTE — ASSESSMENT & PLAN NOTE
Will increase Breo dose   Orders:  •  fluticasone-vilanterol 200-25 mcg/actuation inhaler; Inhale 1 puff daily Rinse mouth after use.  •  predniSONE 20 mg tablet; Take 2 tablets (40 mg total) by mouth daily for 5 days

## 2025-07-23 ENCOUNTER — OFFICE VISIT (OUTPATIENT)
Dept: FAMILY MEDICINE CLINIC | Facility: CLINIC | Age: 60
End: 2025-07-23
Payer: COMMERCIAL

## 2025-07-23 VITALS
BODY MASS INDEX: 25.06 KG/M2 | TEMPERATURE: 98 F | OXYGEN SATURATION: 98 % | RESPIRATION RATE: 14 BRPM | HEART RATE: 72 BPM | DIASTOLIC BLOOD PRESSURE: 80 MMHG | WEIGHT: 179 LBS | HEIGHT: 71 IN | SYSTOLIC BLOOD PRESSURE: 120 MMHG

## 2025-07-23 DIAGNOSIS — Z00.00 ANNUAL PHYSICAL EXAM: Primary | ICD-10-CM

## 2025-07-23 DIAGNOSIS — J45.40 MODERATE PERSISTENT ASTHMA WITHOUT COMPLICATION: ICD-10-CM

## 2025-07-23 PROCEDURE — 99396 PREV VISIT EST AGE 40-64: CPT | Performed by: FAMILY MEDICINE

## 2025-07-23 NOTE — PATIENT INSTRUCTIONS
"Patient Education     Routine physical for adults   The Basics   Written by the doctors and editors at [facility]   What is a physical? -- A physical is a routine visit, or \"check-up,\" with your doctor. You might also hear it called a \"wellness visit\" or \"preventive visit.\"  During each visit, the doctor will:   Ask about your physical and mental health   Ask about your habits, behaviors, and lifestyle   Do an exam   Give you vaccines if needed   Talk to you about any medicines you take   Give advice about your health   Answer your questions  Getting regular check-ups is an important part of taking care of your health. It can help your doctor find and treat any problems you have. But it's also important for preventing health problems.  A routine physical is different from a \"sick visit.\" A sick visit is when you see a doctor because of a health concern or problem. Since physicals are scheduled ahead of time, you can think about what you want to ask the doctor.  How often should I get a physical? -- It depends on your age and health. In general, for people age 21 years and older:   If you are younger than 50 years, you might be able to get a physical every 3 years.   If you are 50 years or older, your doctor might recommend a physical every year.  If you have an ongoing health condition, like diabetes or high blood pressure, your doctor will probably want to see you more often.  What happens during a physical? -- In general, each visit will include:   Physical exam - The doctor or nurse will check your height, weight, heart rate, and blood pressure. They will also look at your eyes and ears. They will ask about how you are feeling and whether you have any symptoms that bother you.   Medicines - It's a good idea to bring a list of all the medicines you take to each doctor visit. Your doctor will talk to you about your medicines and answer any questions. Tell them if you are having any side effects that bother you. You " "should also tell them if you are having trouble paying for any of your medicines.   Habits and behaviors - This includes:   Your diet   Your exercise habits   Whether you smoke, drink alcohol, or use drugs   Whether you are sexually active   Whether you feel safe at home  Your doctor will talk to you about things you can do to improve your health and lower your risk of health problems. They will also offer help and support. For example, if you want to quit smoking, they can give you advice and might prescribe medicines. If you want to improve your diet or get more physical activity, they can help you with this, too.   Lab tests, if needed - The tests you get will depend on your age and situation. For example, your doctor might want to check your:   Cholesterol   Blood sugar   Iron level   Vaccines - The recommended vaccines will depend on your age, health, and what vaccines you already had. Vaccines are very important because they can prevent certain serious or deadly infections.   Discussion of screening - \"Screening\" means checking for diseases or other health problems before they cause symptoms. Your doctor can recommend screening based on your age, risk, and preferences. This might include tests to check for:   Cancer, such as breast, prostate, cervical, ovarian, colorectal, prostate, lung, or skin cancer   Sexually transmitted infections, such as chlamydia and gonorrhea   Mental health conditions like depression and anxiety  Your doctor will talk to you about the different types of screening tests. They can help you decide which screenings to have. They can also explain what the results might mean.   Answering questions - The physical is a good time to ask the doctor or nurse questions about your health. If needed, they can refer you to other doctors or specialists, too.  Adults older than 65 years often need other care, too. As you get older, your doctor will talk to you about:   How to prevent falling at " home   Hearing or vision tests   Memory testing   How to take your medicines safely   Making sure that you have the help and support you need at home  All topics are updated as new evidence becomes available and our peer review process is complete.  This topic retrieved from [employer] on: May 02, 2024.  Topic 187033 Version 1.0  Release: 32.4.3 - C32.122  © 2024 UpToDate, Inc. and/or its affiliates. All rights reserved.  Consumer Information Use and Disclaimer   Disclaimer: This generalized information is a limited summary of diagnosis, treatment, and/or medication information. It is not meant to be comprehensive and should be used as a tool to help the user understand and/or assess potential diagnostic and treatment options. It does NOT include all information about conditions, treatments, medications, side effects, or risks that may apply to a specific patient. It is not intended to be medical advice or a substitute for the medical advice, diagnosis, or treatment of a health care provider based on the health care provider's examination and assessment of a patient's specific and unique circumstances. Patients must speak with a health care provider for complete information about their health, medical questions, and treatment options, including any risks or benefits regarding use of medications. This information does not endorse any treatments or medications as safe, effective, or approved for treating a specific patient. UpToDate, Inc. and its affiliates disclaim any warranty or liability relating to this information or the use thereof.The use of this information is governed by the Terms of Use, available at https://www.woltersBidstalkuwer.com/en/know/clinical-effectiveness-terms. 2024© UpToDate, Inc. and its affiliates and/or licensors. All rights reserved.  Copyright   © 2024 UpToDate, Inc. and/or its affiliates. All rights reserved.

## 2025-07-23 NOTE — PROGRESS NOTES
Adult Annual Physical  Name: Wander Lechuga      : 1965      MRN: [id number]  Encounter Provider: Ayla Bruce MD  Encounter Date: 2025   Encounter department: [facility]    :  Assessment & Plan  Annual physical exam    Orders:  •  CBC; Future  •  Comprehensive metabolic panel; Future  •  Lipid panel; Future  •  TSH, 3rd generation; Future  •  Hemoglobin A1C; Future  •  PSA, total and free; Future    Moderate persistent asthma without complication  Will check if Trelegy is better priced   Orders:  •  fluticasone-umeclidinium-vilanterol 200-62.5-25 mcg/actuation AEPB inhaler; Inhale 1 puff daily Rinse mouth after use.        Preventive Screenings:  - Diabetes Screening: orders placed  - Cholesterol Screening: orders placed   - Hepatitis C screening: screening not indicated   - HIV screening: screening not indicated   - Colon cancer screening: screening up-to-date   - Lung cancer screening: screening not indicated   - Prostate cancer screening: orders placed     Counseling/Anticipatory Guidance:    - Diet: discussed recommendations for a healthy/well-balanced diet.   - Exercise: the importance of regular exercise/physical activity was discussed. Recommend exercise 3-5 times per week for at least 30 minutes.       Depression Screening and Follow-up Plan: Patient was screened for depression during today's encounter. They screened negative with a PHQ-2 score of 0.          History of Present Illness     Adult Annual Physical:  Patient presents for annual physical.     Diet and Physical Activity:  - Diet/Nutrition: well balanced diet.  - Exercise: 5-7 times a week on average.    Depression Screening:  - PHQ-2 Score: 0    General Health:  - Sleep: 4-6 hours of sleep on average.  - Hearing: normal hearing bilateral ears and tinnitus.  - Vision: vision problems and wears contacts.  - Dental: regular dental visits, brushes teeth twice daily and floss regularly.    /GYN  "Health:    - History of STDs: no     Health:  - History of STDs: no.     Advanced Care Planning:  - Has an advanced directive?: no    - Has a durable medical POA?: no      Review of Systems   Constitutional:  Negative for fatigue, fever and unexpected weight change.   HENT:  Negative for congestion, ear discharge, ear pain, hearing loss, rhinorrhea, sinus pressure, sore throat and trouble swallowing.    Eyes: Negative.    Respiratory: Negative.     Cardiovascular: Negative.    Gastrointestinal: Negative.    Endocrine: Negative.    Genitourinary: Negative.    Musculoskeletal: Negative.    Skin: Negative.    Neurological:  Negative for dizziness, weakness, light-headedness and numbness.   Hematological: Negative.    Psychiatric/Behavioral: Negative.           Objective   /80 (BP Location: Left arm, Patient Position: Sitting, Cuff Size: Standard)   Pulse 72   Temp 98 °F (36.7 °C) (Temporal)   Resp 14   Ht 5' 11\" (1.803 m)   Wt 81.2 kg (179 lb)   SpO2 98%   BMI 24.97 kg/m²     Physical Exam  Constitutional:       General: He is not in acute distress.     Appearance: Normal appearance. He is well-developed. He is not ill-appearing.   HENT:      Head: Normocephalic and atraumatic.      Right Ear: Hearing, tympanic membrane, ear canal and external ear normal.      Left Ear: Hearing, tympanic membrane, ear canal and external ear normal.      Nose: No congestion or rhinorrhea.      Mouth/Throat:      Pharynx: No oropharyngeal exudate or posterior oropharyngeal erythema.     Eyes:      Extraocular Movements: Extraocular movements intact.      Conjunctiva/sclera: Conjunctivae normal.     Neck:      Thyroid: No thyroid mass or thyromegaly.      Vascular: No JVD.     Cardiovascular:      Rate and Rhythm: Normal rate and regular rhythm.      Heart sounds: Normal heart sounds. No murmur heard.     No gallop.   Pulmonary:      Effort: No respiratory distress.      Breath sounds: Normal breath sounds. No wheezing, " rhonchi or rales.   Abdominal:      Palpations: Abdomen is soft.      Tenderness: There is no abdominal tenderness. There is no right CVA tenderness or left CVA tenderness.     Musculoskeletal:         General: No swelling or tenderness.      Cervical back: Normal range of motion and neck supple. No rigidity or tenderness.      Right lower leg: No edema.      Left lower leg: No edema.   Lymphadenopathy:      Cervical: No cervical adenopathy.     Skin:     Coloration: Skin is not pale.      Findings: No rash.     Neurological:      Mental Status: He is alert and oriented to person, place, and time.      Cranial Nerves: No cranial nerve deficit.      Motor: No weakness.      Gait: Gait normal.     Psychiatric:         Mood and Affect: Mood normal.         Behavior: Behavior normal.         Thought Content: Thought content normal.         Judgment: Judgment normal.

## 2025-07-23 NOTE — ASSESSMENT & PLAN NOTE
Will check if Trelegy is better priced   Orders:  •  fluticasone-umeclidinium-vilanterol 200-62.5-25 mcg/actuation AEPB inhaler; Inhale 1 puff daily Rinse mouth after use.

## (undated) DEVICE — CHLORAPREP HI-LITE 26ML ORANGE

## (undated) DEVICE — NEURO PATTIES 1/2 X 3

## (undated) DEVICE — TUBING ARTHROSCOPIC WAVE  MAIN PUMP

## (undated) DEVICE — TUBING SUCTION 5MM X 12 FT

## (undated) DEVICE — ABDOMINAL PAD: Brand: DERMACEA

## (undated) DEVICE — 3M™ STERI-STRIP™ REINFORCED ADHESIVE SKIN CLOSURES, R1547, 1/2 IN X 4 IN (12 MM X 100 MM), 6 STRIPS/ENVELOPE: Brand: 3M™ STERI-STRIP™

## (undated) DEVICE — SPLINT 1524050 5PK PAIR DOYLE II AIRWAY: Brand: DOYLE II ™

## (undated) DEVICE — 4-PORT MANIFOLD: Brand: NEPTUNE 2

## (undated) DEVICE — MAYO STAND COVER: Brand: CONVERTORS

## (undated) DEVICE — ASTOUND SURGICAL GOWN, XXX LARGE, X-LONG: Brand: CONVERTORS

## (undated) DEVICE — 3000CC GUARDIAN II: Brand: GUARDIAN

## (undated) DEVICE — GLOVE INDICATOR PI UNDERGLOVE SZ 6.5 BLUE

## (undated) DEVICE — SUCTION BOVIE ENT

## (undated) DEVICE — PURAGEL IS A HEMOSTAT FOR MILD AND MODERATE BLEEDING INTENDED TO BE USED IN A VARIETY OF INDICATIONS. (3 ML): Brand: PURAGEL 3ML

## (undated) DEVICE — SHEATH 1912000 5PK 4MM/0DEG STORZ XOMED: Brand: ENDO-SCRUB®

## (undated) DEVICE — GLOVE INDICATOR PI UNDERGLOVE SZ 8 BLUE

## (undated) DEVICE — GLOVE SRG BIOGEL 7.5

## (undated) DEVICE — PACK ARTHROSCOPY

## (undated) DEVICE — POSITIONER TRIMANO LIMB BEACH CHAIR

## (undated) DEVICE — DRAPE SURGIKIT SADDLE BAG

## (undated) DEVICE — SYRINGE 10ML LL

## (undated) DEVICE — GAUZE SPONGES,16 PLY: Brand: CURITY

## (undated) DEVICE — NEEDLE 25G X 1 1/2

## (undated) DEVICE — INSTRUMENT TRACKER 9733533XOM ENT 1PK

## (undated) DEVICE — PROBE ABLATION  APOLLORF 90 DEG MULTI PORT

## (undated) DEVICE — SPLINT INTRANASAL 0.6 X 2 IN POSISEP X

## (undated) DEVICE — 3M™ STERI-DRAPE™ U-DRAPE 1015: Brand: STERI-DRAPE™

## (undated) DEVICE — TUBING 1895522 5PK STRAIGHTSHOT TO XPS: Brand: STRAIGHTSHOT®

## (undated) DEVICE — GLOVE SRG BIOGEL 6.5

## (undated) DEVICE — 3M™ IOBAN™ 2 ANTIMICROBIAL INCISE DRAPE 6640EZ: Brand: IOBAN™ 2

## (undated) DEVICE — INTENDED FOR TISSUE SEPARATION, AND OTHER PROCEDURES THAT REQUIRE A SHARP SURGICAL BLADE TO PUNCTURE OR CUT.: Brand: BARD-PARKER ® CARBON RIB-BACK BLADES

## (undated) DEVICE — PROXIMATE SKIN STAPLERS (35 WIDE) CONTAINS 35 STAINLESS STEEL STAPLES (FIXED HEAD): Brand: PROXIMATE

## (undated) DEVICE — POSITIONER HEAD DISP STRL

## (undated) DEVICE — FOOT SWITCH DRAPE: Brand: UNBRANDED

## (undated) DEVICE — PATIENT TRACKER 9734887XOM NON-INVASIVE

## (undated) DEVICE — ANTI-FOG SOLUTION WITH FOAM PAD: Brand: DEVON

## (undated) DEVICE — NEEDLE SUT SCORPION MEGALOADER

## (undated) DEVICE — STERILE NASAL PACK: Brand: CARDINAL HEALTH

## (undated) DEVICE — STRETCH BANDAGE: Brand: CURITY

## (undated) DEVICE — GLOVE SRG BIOGEL ECLIPSE 8

## (undated) DEVICE — DUAL SPIKE ADAPTER: Brand: CONMED

## (undated) DEVICE — TIBURON BEACH CHAIR SHOULDER DRAPE: Brand: CONVERTORS

## (undated) DEVICE — BLADE 1884080EM TRICUT 4MMX13CM M4 ROHS: Brand: FUSION®

## (undated) DEVICE — SUT ETHILON 4-0 PS-2 18 IN 1667G

## (undated) DEVICE — BLADE 1882040 5PK INFERIOR TURB 2MM

## (undated) DEVICE — BLADE 1884380EM QUADCUT 4.3MMX13CM ROHS: Brand: ROTATABLE FUSION®

## (undated) DEVICE — FIBERTAK ROTATOR CUFF DISPOSABLES KIT

## (undated) DEVICE — CURITY NON-ADHERENT STRIPS: Brand: CURITY

## (undated) DEVICE — SHEATH 1912010 5PK 4MM/30DEG STORZ XOMED: Brand: ENDO-SCRUB®

## (undated) DEVICE — TELFA NON-ADHERENT ABSORBENT DRESSING: Brand: TELFA

## (undated) DEVICE — SPECIMEN SOCK - SHORT: Brand: MEDI-VAC

## (undated) DEVICE — 3M™ MICROFOAM™ SURGICAL TAPE 4 ROLLS/CARTON 6 CARTONS/CASE 1528-3: Brand: 3M™ MICROFOAM™

## (undated) DEVICE — NEEDLE SPINAL 22G X 3.5IN  QUINCKE

## (undated) DEVICE — DISPOSABLE EQUIPMENT COVER: Brand: LARGE TOWEL DRAPE

## (undated) DEVICE — CANNULA BUTTON 8 X 30MM PASSPORT

## (undated) DEVICE — U-DRAPE: Brand: CONVERTORS

## (undated) DEVICE — SPECIMEN CONTAINER STERILE PEEL PACK

## (undated) DEVICE — BOWL: 16OZ PEELPOUCH 75/CS 16/PLT: Brand: MEDEGEN MEDICAL PRODUCTS, LLC

## (undated) DEVICE — BLADE SHAVER TORPEDO 4MM 13CM  COOLCUT

## (undated) DEVICE — SYRINGE 20ML LL